# Patient Record
Sex: FEMALE | Race: WHITE | NOT HISPANIC OR LATINO | Employment: FULL TIME | URBAN - METROPOLITAN AREA
[De-identification: names, ages, dates, MRNs, and addresses within clinical notes are randomized per-mention and may not be internally consistent; named-entity substitution may affect disease eponyms.]

---
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
## 2018-07-09 ENCOUNTER — RECORDS - HEALTHEAST (OUTPATIENT)
Dept: ADMINISTRATIVE | Facility: OTHER | Age: 18
End: 2018-07-09

## 2019-08-26 ENCOUNTER — DOCUMENTATION ONLY (OUTPATIENT)
Dept: OTHER | Facility: CLINIC | Age: 19
End: 2019-08-26

## 2019-08-26 ENCOUNTER — AMBULATORY - HEALTHEAST (OUTPATIENT)
Dept: OTHER | Facility: CLINIC | Age: 19
End: 2019-08-26

## 2020-03-17 ENCOUNTER — VIRTUAL VISIT (OUTPATIENT)
Dept: FAMILY MEDICINE | Facility: OTHER | Age: 20
End: 2020-03-17

## 2020-03-18 NOTE — PROGRESS NOTES
"Date: 2020 15:57:40  Clinician: BRITTANY Yang  Clinician NPI: [id number]  Patient: Donna Hurtado  Patient : 2000  Patient Address: [address] [place] [id number]  Patient Phone: [phone]  Visit Protocol: URI  Patient Summary:  Donna is a 19 year old ( : 2000 ) female who initiated a Visit for COVID-19 (Coronavirus) evaluation and screening. When asked the question \"Please sign me up to receive news, health information and promotions. \", Donna responded \"Yes\".    Donna states her symptoms started gradually 3-6 days ago. After her symptoms started, they improved and then got worse again.   Her symptoms consist of wheezing, a sore throat, a cough, nasal congestion, tooth pain, ear pain, malaise, a headache, facial pain or pressure, and myalgia.   Symptom details     Nasal secretions: The color of her mucus is yellow.    Cough: Donna coughs every 5-10 minutes and her cough is more bothersome at night. Phlegm comes into her throat when she coughs. She does not believe her cough is caused by post-nasal drip. The color of the phlegm is yellow.     Sore throat: Donna reports having mild throat pain (1-3 on a 10 point pain scale), does not have exudate on her tonsils, and can swallow liquids. She is not sure if the lymph nodes in her neck are enlarged. A rash has not appeared on the skin since the sore throat started.     Wheezing: Donna has not ever been diagnosed with asthma. The wheezing does not interfere with her normal daily activities.    Facial pain or pressure: The facial pain or pressure feels worse when bending over or leaning forward.     Headache: She states the headache is moderate (4-6 on a 10 point pain scale).     Tooth pain: The tooth pain is caused by a cavity, recent dental work, or other mouth problems.      Donna denies having fever, chills, and rhinitis. She also denies taking antibiotic medication for the symptoms, having a sinus infection within the " past year, and having recent facial or sinus surgery in the past 60 days. She is not experiencing dyspnea.   Precipitating events  Within the past week, Donna has not been exposed to someone with strep throat. She has not recently been exposed to someone with influenza. Donna has been in close contact with the following high risk individuals: immunocompromised people, adults 65 or older, pregnant women, children under the age of 5, and people with asthma, heart disease or diabetes.   Pertinent COVID-19 (Coronavirus) information  Donna has not traveled internationally or to the areas where COVID-19 (Coronavirus) is widespread in the last 14 days before the start of her symptoms.   Donna has not had a close contact with a laboratory-confirmed COVID-19 patient within 14 days of symptom onset. She has had a close contact with a suspected COVID-19 patient within 14 days of symptom onset.   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   Pertinent medical history  Donna typically gets a yeast infection when she takes antibiotics. She has used fluconazole (Diflucan) to treat previous yeast infections. 2 doses of fluconazole (Diflucan) has typically been needed for symptoms to resolve in the past.  Donna needs a return to work/school note.   Weight: 120 lbs   Donna smokes or uses smokeless tobacco.   She denies pregnancy and denies breastfeeding. She does not menstruate.   Additional information as reported by the patient (free text): I have a bicuspid aortic valve. I work for [employer] and have shown symptoms and require screening before I can return to work. I have tightness in my chest especially when coughing and have developing joint pain.   Weight: 120 lbs    MEDICATIONS: propranolol oral, buspirone oral, quetiapine oral, clonazepam oral, ALLERGIES: NKDA  Clinician Response:  Dear Donna,   Based on the information you have provided, you do have symptoms that are consistent with  Coronavirus (COVID-19).  The coronavirus causes mild to severe respiratory illness with the most common symptoms including fever, cough and difficulty breathing. Unfortunately, many viruses cause similar symptoms and it can be difficult to distinguish between viruses, especially in mild cases, so we are presuming that anyone with cough or fever has coronavirus at this time.  Coronavirus/COVID-19 has reached the point of community spread in [place], meaning that we are finding the virus in people with no known exposure risk for claire the virus. Given the increasing commonness of coronavirus in the community we are no longer testing patients who are not critically ill.  For everyone else who has cough or fever, you should assume you are infected with coronavirus. Accordingly, you should self-quarantine for fourteen days from the first day your symptoms started. You should call if you find increasing shortness of breath, wheezing or sustained fever above 101.5. If you are significantly short of breath or experience chest pain you should call 911 or report to the nearest emergency department for urgent evaluation.    Isolate yourself at home.   Do Not allow any visitors  Do Not go to work or school  Do Not go to [employer],  centers, shopping, or other public places.  Do Not shake hands.  Avoid close contact with others (hugging, kissing).   Protect Others:    Cover Your Mouth and Nose with a mask, disposable tissue or wash cloth to avoid spreading germs to others.  Wash your hands and face frequently with soap and water.   If you develop significant shortness of breath that prevents you from doing normal activities, please call 911 or proceed to the nearest emergency room and alert them immediately that you have been in self-isolation for possible coronavirus.   For more information about COVID19 and options for caring for yourself at home, please visit the CDC website at  https://www.cdc.gov/coronavirus/2019-ncov/about/steps-when-sick.htmlFor more options for care at [facility], please visit our website at [email or web address]     COVID-19 (Coronavirus) General Information  With the increase in the number of COVID-19 (Coronavirus) cases, we understand you may have some questions. Below is some helpful information on COVID-19 (Coronavirus).  How can I protect myself and others from the COVID-19 (Coronavirus)?  Because there is currently no vaccine to prevent infection, the best way to protect yourself is to avoid being exposed to this virus. Put distance between yourself and other people if COVID-19 (Coronavirus) is spreading in your community. The virus is thought to spread mainly from person-to-person.     Between people who are in close contact with one another (within about 6 about) for prolonged period (10 minutes or longer).    Through respiratory droplets produced when an infected person coughs or sneezes.     The CDC recommends the following additional steps to protect yourself and others:     Wash your hands often with soap and water for at least 20 seconds, especially after blowing your nose, coughing, or sneezing; going to the bathroom; and before eating or preparing food.  Use an alcohol-based hand  that contains at least 60 percent alcohol if soap and water are not available.        Avoid touching your eyes, nose and mouth with unwashed hands.    Avoid close contact with people who are sick.    Stay home when you are sick.    Cover your cough or sneeze with a tissue, then throw the tissue in the trash.    Clean and disinfect frequently touched objects and surfaces.     You can help stop COVID-19 (Coronavirus) by knowing the signs and symptoms:     Fever    Cough    Shortness of breath     Contact your healthcare provider if   Develop symptoms   AND   Have been in close contact with a person known to have COVID-19 (Coronavirus)  or live in or have recently traveled from an area with ongoing spread of COVID-19 (Coronavirus). Call ahead before you go to a doctor's office or emergency room. Tell them about your recent travel and your symptoms.   For the most up to date information, visit the CDC's website.  Steps to help prevent the spread of COVID-19 (Coronavirus) if you are sick  If you are sick with COVID-19 (Coronavirus) or suspect you are infected with the virus that causes COVID-19 (Coronavirus), follow the steps below to help prevent the disease from spreading&nbsp;to people in your home and community.     Stay home except to get medical care. Home isolation may be started in consultation with your healthcare clinician.    Separate yourself from other people and animals in your home.    Call ahead before visiting your doctor if you have a medical appointment.    Wear a facemask when you are around other people.    Cover your cough and sneezes.    Clean your hands often.    Avoid sharing personal household items.    Clean and disinfect frequently touched objects and surfaces everyday.    You will need to have someone drop off medications or household supplies (if needed) at your house without coming inside or in contact with you or others living in your house.    Monitor your symptoms and seek prompt medical care if your illness is worsening (e.g. Difficulty breathing).    Discontinue home isolation only in consultation with your healthcare provider.     For more detailed and up to date information on what to do if you are sick, visit this link: What to Do If You Are Sick With Coronavirus Disease 2019 (COVID-19).  Do I need to be tested for COVID-19 (Coronavirus)?     At this time, the limited number of tests available are controlled by the state and local health departments and are being reserved for more seriously ill patients, those with known exposure to confirmed patients, and those with recent travel (within 14 days) to countries  "with high rates of COVID-19 (Coronavirus).    Decisions on which patients receive testing will be based on the local spread of COVID-19 (Coronavirus) as well as the symptoms. Your healthcare provider will make the final decision on whether you should be tested.    In the meantime, if you have concerns that you may have been exposed, it is reasonable to practice \"social distancing.\"&nbsp; If you are ill with a cold or flu-like illness, please monitor your symptoms and reach out to your healthcare provider if your symptoms worsen.    For more up to date information, visit this link: COVID-19 (Coronavirus) Frequently Asked Questions and Answers.      Diagnosis: Coronavirus infection, unspecified  Diagnosis ICD: B34.2  Additional Clinician Notes: In addition to your symptoms of Covid-19, I feel you may also be suffering from viral sinusitis. I have included over-the-counter and at-home remedies to help treat this. Should your sinus symptoms last beyond 7 days, I have included a promo code for a follow-up visit for further evaluation.  "

## 2021-05-30 VITALS — WEIGHT: 119.3 LBS | BODY MASS INDEX: 20.97 KG/M2

## 2021-06-08 NOTE — PROGRESS NOTES
ASSESSMENT & PLAN:  1. Anxiety    Continue with the Lexapro and compliance with medication intake.  Continue to reach out to her friend or her mom for support.  Monitor for symptom changes.  Recheck in 6 months, earlier if worse.  She was agreeable with the plans.    Medications Discontinued During This Encounter   Medication Reason     escitalopram oxalate (LEXAPRO) 5 MG tablet Reorder     escitalopram oxalate (LEXAPRO) 5 MG tablet Duplicate order     ethynodiol-ethinyl estradiol (ZOVIA 1/35E, 28,) 1-35 mg-mcg per tablet Therapy completed       CHIEF COMPLAINT:  Chief Complaint   Patient presents with     Medication Refill     Escitalopram- pt is requesting a 90 day supply.         HISTORY OF PRESENT ILLNESS:  Donna is a 16 y.o. female presenting to the clinic today for a follow up on her anxiety. She would prefer a prescription for a 90 day supply at a time, if possible. She has been taking 5 mg Lexapro daily, but not always within the same hour of the day which she is concerned about. She notes she is trying to be better about taking it at the same time every day but it is difficult because she is a college student. She did not notice much benefit to Lexapro initially, but she noticed she slowly stopped having panic attacks. She has had about 2 major panic attacks since March 2016 and she called her mom during those times which was helpful. She is happy having a panic attack only once every 6 months rather than 1-2 times per week as she was before. She knows that a major break up was the trigger for one of her panic attacks. She has made one close friend at college so far who she is able to share her emotions with. She is not seeing a therapist or counselor at this time. School is very stressful, but it is manageable for her. She thinks she would be feeling much worse without the Lexapro on board. GAD7 score of 4, PHQ9 of 3.    REVIEW OF SYSTEMS:   She denies feeling shaky, having excessive mood swings,  irritability, problems with focus or attention, life-altering changes, problems with sleep or problems with appetite. She got an IUD mostly because she not taking her OCP's at the same time every night and it was causing problems for her. All other systems are negative.     PFSH:     TOBACCO USE:  History   Smoking Status     Never Smoker   Smokeless Tobacco     Never Used        VITALS:  Vitals:    02/03/17 1553   BP: 110/60   Patient Site: Left Arm   Patient Position: Sitting   Cuff Size: Adult Regular   Pulse: 82   SpO2: 100%   Weight: 119 lb 4.8 oz (54.1 kg)     Wt Readings from Last 3 Encounters:   02/03/17 119 lb 4.8 oz (54.1 kg) (38 %, Z= -0.30)*   12/13/16 122 lb 5 oz (55.5 kg) (45 %, Z= -0.12)*   11/23/16 120 lb 2 oz (54.5 kg) (41 %, Z= -0.23)*     * Growth percentiles are based on [facility] 2-20 Years data.     Body mass index is 20.97 kg/(m^2).     PHYSICAL EXAM:  Visit Vitals     /60 (Patient Site: Left Arm, Patient Position: Sitting, Cuff Size: Adult Regular)     Pulse 82     Wt 119 lb 4.8 oz (54.1 kg)     LMP Comment: IUD     SpO2 100%     Breastfeeding No     BMI 20.97 kg/m2       Vital signs noted above. AAO ×3. Wearing glasses. Psych: Appears well and appropriate for age.  No flight of ideas.  Good eye contact.  Mood and insight appropriate.    DATA REVIEWED:  ADDITIONAL HISTORY SUMMARIZED (2): Reviewed Jasmin Castro's note 3/14/2016 regarding anxiety.   DECISION TO OBTAIN EXTRA INFORMATION (1): None.   RADIOLOGY TESTS (1): None.  LABS (1): None.  MEDICINE TESTS (1): None.  INDEPENDENT REVIEW (2 each): None.     The visit lasted a total of 7 minutes face to face with the patient. Over 50% of the time was spent counseling and educating the patient about her anxiety and panic attacks.     Brigitte SLOAN, am scribing for and in the presence of Dr. Julian.  Dr. Eliel SLOAN, personally performed the services described in this documentation, as scribed by Brigitte Gaspar in my presence, and it is both accurate and  complete.     MEDICATIONS:  Current Outpatient Prescriptions   Medication Sig Dispense Refill     escitalopram oxalate (LEXAPRO) 5 MG tablet TAKE ONE TABLET BY MOUTH ONCE DAILY 30 tablet 0     LEVONORGESTREL (MIRENA UTRN) by Intrauterine route.       No current facility-administered medications for this visit.         Total data points: 2

## 2023-09-19 ENCOUNTER — TELEPHONE (OUTPATIENT)
Dept: OPHTHALMOLOGY | Facility: CLINIC | Age: 23
End: 2023-09-19
Payer: COMMERCIAL

## 2023-09-19 ENCOUNTER — TELEPHONE (OUTPATIENT)
Dept: CALL CENTER | Age: 23
End: 2023-09-19
Payer: COMMERCIAL

## 2023-09-19 NOTE — TELEPHONE ENCOUNTER
Derik Mendez contacted [facility] on 09/19/23 and left a message. If patient calls back please schedule appointment for ER follow up..

## 2023-09-19 NOTE — TELEPHONE ENCOUNTER
Spoke to pt at   Left eye medial visual field disturbances times one month and over weekend worsened some and today seen in ED as medial part of vision loss.    MRI done at [facility] and no acute pathology.    No new floaters/flashing prior to onset per pt-- pt noticing the medial disturbance at times for on month before worsening over weekend/today.    Pt states has not continued to worsen today    Pt reporting dull ache    No other eye symptoms noted    Scheduled in acute clinic tomorrow and pt aware of date/time/location/duration/hospital based clinic/main clinic number/parking/non-hum[employer] insurance.    Reviewed with pt if felt vision acute worsening today/tonight ok to proceed to [address] [place] [address] ED.    Kendall Santiago RN 3:55 PM 09/19/23          [phone] home/other     Called twice at 1317 and left message with direct number/    Kendall Santiago RN 1:17 PM 09/19/23            [facility]    Phone Message    May a detailed message be left on voicemail: yes     Reason for Call: Appointment Intake    Referring Provider Name: ED- Dr. Kendall Wilson.   Diagnosis and/or Symptoms: Vision loss. Patient was seen in the ED this morning and was referred to Ophth because they didn't have the proper equipment to test for retina detachment per pt.     Priority= Emergency. Please call patient to advise ASAP.     Action Taken: Other: eye    Travel Screening: Not Applicable

## 2023-09-20 ENCOUNTER — TELEPHONE (OUTPATIENT)
Dept: OPHTHALMOLOGY | Facility: CLINIC | Age: 23
End: 2023-09-20
Payer: COMMERCIAL

## 2023-09-20 NOTE — TELEPHONE ENCOUNTER
Spoke to pt at 1345    Reviewed with add on to acute clinic not able to reschedule late this afternoon.    Offered tomorrow at 1230 in acute clinic and pt accepts    Kendall Santiago RN 1:49 PM 09/20/23            [facility]    Phone Message    May a detailed message be left on voicemail: yes     Reason for Call: Other: Pt is having car issues and would like to know if her 12:30 appt with  can be rescheduled to like 1:30 or 2:30. Please reach out to pt and advise. Thank You!     Action Taken: Message routed to:  [facility] eye    Travel Screening: Not Applicable

## 2023-09-21 ENCOUNTER — TELEPHONE (OUTPATIENT)
Dept: OPHTHALMOLOGY | Facility: CLINIC | Age: 23
End: 2023-09-21
Payer: COMMERCIAL

## 2023-09-21 ENCOUNTER — OFFICE VISIT (OUTPATIENT)
Dept: OPHTHALMOLOGY | Facility: CLINIC | Age: 23
End: 2023-09-21
Payer: COMMERCIAL

## 2023-09-21 DIAGNOSIS — H33.002 RHEGMATOGENOUS RETINAL DETACHMENT OF LEFT EYE: Primary | ICD-10-CM

## 2023-09-21 DIAGNOSIS — H53.9 CHANGES IN VISION: ICD-10-CM

## 2023-09-21 PROCEDURE — 99213 OFFICE O/P EST LOW 20 MIN: CPT | Performed by: OPHTHALMOLOGY

## 2023-09-21 PROCEDURE — 92250 FUNDUS PHOTOGRAPHY W/I&R: CPT | Performed by: OPHTHALMOLOGY

## 2023-09-21 PROCEDURE — 92083 EXTENDED VISUAL FIELD XM: CPT | Mod: 26 | Performed by: OPHTHALMOLOGY

## 2023-09-21 PROCEDURE — 92083 EXTENDED VISUAL FIELD XM: CPT | Performed by: OPHTHALMOLOGY

## 2023-09-21 PROCEDURE — 99204 OFFICE O/P NEW MOD 45 MIN: CPT | Mod: GC | Performed by: OPHTHALMOLOGY

## 2023-09-21 PROCEDURE — 92133 CPTRZD OPH DX IMG PST SGM ON: CPT | Performed by: OPHTHALMOLOGY

## 2023-09-21 PROCEDURE — 99207 OCT OPTIC NERVE RNFL SPECTRALIS OU (BOTH EYES): CPT | Mod: 26 | Performed by: OPHTHALMOLOGY

## 2023-09-21 PROCEDURE — 99207 PR BUNDLED PROCEDURE IN GLOBAL PKG: CPT | Mod: 26 | Performed by: OPHTHALMOLOGY

## 2023-09-21 PROCEDURE — 92134 CPTRZ OPH DX IMG PST SGM RTA: CPT | Mod: 26 | Performed by: OPHTHALMOLOGY

## 2023-09-21 PROCEDURE — 92134 CPTRZ OPH DX IMG PST SGM RTA: CPT | Performed by: OPHTHALMOLOGY

## 2023-09-21 RX ORDER — CETIRIZINE HYDROCHLORIDE 10 MG/1
1 TABLET ORAL DAILY
COMMUNITY

## 2023-09-21 RX ORDER — QUETIAPINE FUMARATE 25 MG/1
1 TABLET, FILM COATED ORAL AT BEDTIME
COMMUNITY

## 2023-09-21 RX ORDER — FLUCONAZOLE 150 MG/1
150 TABLET ORAL
COMMUNITY

## 2023-09-21 RX ORDER — BUSPIRONE HYDROCHLORIDE 10 MG/1
10 TABLET ORAL
COMMUNITY

## 2023-09-21 RX ORDER — CLONAZEPAM 1 MG/1
1 TABLET ORAL
COMMUNITY

## 2023-09-21 ASSESSMENT — CUP TO DISC RATIO
OD_RATIO: 0.3
OS_RATIO: 0.3

## 2023-09-21 ASSESSMENT — CONF VISUAL FIELD
METHOD: COUNTING FINGERS
OS_INFERIOR_TEMPORAL_RESTRICTION: 0
OS_SUPERIOR_TEMPORAL_RESTRICTION: 0
OS_INFERIOR_NASAL_RESTRICTION: 0
OD_SUPERIOR_TEMPORAL_RESTRICTION: 0
OS_SUPERIOR_NASAL_RESTRICTION: 0
OD_SUPERIOR_NASAL_RESTRICTION: 0
OD_INFERIOR_NASAL_RESTRICTION: 0
OD_NORMAL: 1
OD_INFERIOR_TEMPORAL_RESTRICTION: 0
OS_NORMAL: 1

## 2023-09-21 ASSESSMENT — VISUAL ACUITY
OD_PH_CC: 20/20
OD_CC+: -2
OD_CC: 20/30
CORRECTION_TYPE: GLASSES
OD_PH_CC+: -1
METHOD: SNELLEN - LINEAR
OS_CC: 20/40

## 2023-09-21 ASSESSMENT — REFRACTION_MANIFEST
OD_SPHERE: -3.50
OD_AXIS: 015
OS_CYLINDER: SPHERE
OS_SPHERE: -3.75
OD_CYLINDER: +1.25

## 2023-09-21 ASSESSMENT — REFRACTION_WEARINGRX
OD_SPHERE: -2.00
OD_CYLINDER: +0.75
OS_AXIS: 115
OS_SPHERE: -3.00
OD_AXIS: 011
OS_CYLINDER: +0.25

## 2023-09-21 ASSESSMENT — TONOMETRY
IOP_METHOD: ICARE
OD_IOP_MMHG: 21
OS_IOP_MMHG: 20

## 2023-09-21 ASSESSMENT — SLIT LAMP EXAM - LIDS
COMMENTS: NORMAL
COMMENTS: NORMAL

## 2023-09-21 ASSESSMENT — EXTERNAL EXAM - RIGHT EYE: OD_EXAM: NORMAL

## 2023-09-21 ASSESSMENT — EXTERNAL EXAM - LEFT EYE: OS_EXAM: NORMAL

## 2023-09-21 NOTE — TELEPHONE ENCOUNTER
Contacted patient regarding surgery with Dr. Shepherd.  Explained to patient that we are still waiting to confirm with OR that this is available. If unable to schedule at the [facility], there is a possibility that surgery could be completed at the [facility]. Asked patient to follow Dr. Shepherd's instructions and remain NPO after midnight.     Patient aware that writer will contact patient around 7:00 a.m. to determine if case can go/plan for surgery start. Patient was understanding and will potentially plan to report to the 5th floor of the [facility] tomorrow, 9/22/2023 at 9:00 a.m. She has no further questions at this time.    Jenn Velazquez on 9/21/2023 at 5:50 PM

## 2023-09-21 NOTE — NURSING NOTE
Chief Complaints and History of Present Illnesses   Patient presents with    Vision Changes Os     Chief Complaint(s) and History of Present Illness(es)       Vision Changes Os              Laterality: left eye    Onset: 1 month ago    Course: gradually worsening    Associated symptoms: haloes, eye pain, headache and floaters.  Negative for flashes    Treatments tried: artificial tears              Comments    Here for vision changes left eye that started about 1 month ago. Worsen a several days ago. Medial visual field loss and tunnel vision. Some eye pain and HA. Notes halos and floaters without flashes. Uses AT as needed. Some pressure sensation on tear duct. No issues with the right eye.    Hx of amblyopia left eye s/p strabismus sx left eye at age 2.    Pawan Garces COT 12:22 PM September 21, 2023

## 2023-09-21 NOTE — LETTER
September 21, 2023      Re: Donna Hurtado   2000    To Whom It May Concern:    This is to confirm that the above patient was seen on 9/21/2023.  Please excuse her  from work today and tomorrow.    Thank you for your cooperation in this matter.  Please do not hesitate to contact me if you have any further questions.    Sincerely,      Baylee Collins MD

## 2023-09-21 NOTE — PATIENT INSTRUCTIONS
Please do not eat or drink anything by mouth after MIDNIGHT     We will call you tomorrow to schedule your surgery

## 2023-09-21 NOTE — PROGRESS NOTES
"   CC: Retinal detachment  consult  First appointment with me  Referred by:   HPI: Donna Hurtado is a 23 year old year-old patient who presents for 1 month of gradual medial visual field loss and tunnel vision of the left eye.   Endorses pain and associated HA. Endorses halos and floaters.   States that she has had a very stressful summer due to \"family issues,\" a wedding, and moving into a new house.      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  FH:  glaucoma or Age related macular degeneration.+ ulcerative colitis on mom's side   Review of systems for the eyes was negative other than the pertinent positives/negatives listed in the HPI.      Retinal Imaging:  OCT 09/21/23   RE: normal foveal contour  LE: macula subretinal fluid     optos consistent with exam  Autofluorescence: peripheral areas of hypoautofluorescence left eye   fluorescein angiography:  Normal av and choroidal filling; mild peripheral vascular leakage left eye; no vasculitis  OVF: right eye: few sup spots of decreased sensitivity  Left eye: nasal areas of decreased sensitivity    Assessment & Plan:  # Retinal detachment  Macula off left eye   Possibly chronic  Inf peripheral small holes    recommend  scleral buckle left eye/  possible cryotherapy/ possible indirect laser ophthalmoscopy/ possible subretinal fluid drainage left eye   2 hours  General anesthesia    I reviewed the indications, risks, benefits, and alternatives of the proposed surgical procedure including, but not limited to, failure to improve vision or further loss of vision,  and need for additional surgery, bleeding, infection, loss of vision and the remote possibility of complications of anesthesia. 1:1000 risk of infection/bleed/loss of eye; 1:100 risk of RD and need for further surgery. Patient aware " of prolonged healing after retinal surgery (up to a year after surgery) as well as possibility of a gas/SO  instillation into eye. Patient agreed to proceed with surgery.  I provided multiple opportunities for the questions, answered all questions to the best of my ability, and confirmed that my answers and my discussion were understood.   With scleral buckle risk for diplopia.    #myopia both eyes  # history of amblyopia left eye   # retina hole left eye  With surrounded pigment  Recommend prophulactic laser.  Patient would like to wait until after surgery left eye        Baylee Collins MD  Resident Physician, PGY-2  Department of Ophthalmology  09/21/23 1:14 PM  ~~~~~~~~~~~~~~~~~~~~~~~~~~~~~~~~~~   Complete documentation of historical and exam elements from today's encounter can be found in the full encounter summary report (not reduplicated in this progress note).  I personally obtained the chief complaint(s) and history of present illness.  I confirmed and edited as necessary the review of systems, past medical/surgical history, family history, social history, and examination findings as documented by others; and I examined the patient myself.  I personally reviewed the relevant tests, images, and reports as documented above.  I personally reviewed the ophthalmic test(s) associated with this encounter, agree with the interpretation(s) as documented by the resident/fellow, and have edited the corresponding report(s) as necessary.   I formulated and edited as necessary the assessment and plan and discussed the findings and management plan with the patient and family    Slime Shepherd MD  Professor of Ophthalmology.   Vitreo-retinal surgeon   Department of Ophthalmology and Visual Neurosciences   [facility]  Phone: [phone]   Fax: [phone]

## 2023-09-21 NOTE — LETTER
September 21, 2023      Re: Donna Hurtado   2000    To Whom It May Concern:    This is to confirm that the above patient was seen on 9/21/2023. Please excuse Donna Hurtado today and she is unable to return to work tomorrow.    Thank you for your cooperation in this matter.  Please do not hesitate to contact me if you have any further questions.    Sincerely,      Baylee Collins MD

## 2023-09-21 NOTE — PROGRESS NOTES
Pre-Operative H & P     CC:  Preoperative exam to assess for increased cardiopulmonary risk while undergoing surgery and anesthesia.    Date of Encounter: 9/21/2023  Primary Care Physician:  No Ref-Primary, Physician     Reason for visit:   Encounter Diagnoses   Name Primary?    Changes in vision Yes    Rhegmatogenous retinal detachment of left eye        [facility]  Donna Hurtado is a 23 year old female who presents for pre-operative H & P in preparation for retinal detachment repair with Dr. Shepherd on 09/22/23 at [facility].     History is obtained from the patient and chart review.     Hx of abnormal bleeding or anti-platelet use: regular ibuprofen use for headache    Menstrual history: No LMP recorded.: 7 years ago (IUD)    Prior to Admission Medications  Current Outpatient Medications   Medication Sig Dispense Refill    busPIRone (BUSPAR) 10 MG tablet Take 10 mg by mouth      cetirizine (ZYRTEC) 10 MG tablet Take 1 tablet by mouth daily      clonazePAM (KLONOPIN) 1 MG tablet Take 1 mg by mouth      fluconazole (DIFLUCAN) 150 MG tablet Take 150 mg by mouth      QUEtiapine (SEROQUEL) 25 MG tablet Take 1 tablet by mouth At Bedtime         Family History  Family History   Problem Relation Age of Onset    Glaucoma No family hx of     Macular Degeneration No family hx of        The complete review of systems is negative other than noted in the HPI or here.     Preprocedure Note            No Anesthesia note exists            Physical Exam  Vitals: /62 HR 56 TEMP 97.4 O2SAT 98%  Constitutional: Awake, alert, cooperative, no apparent distress, and appears stated age.  Eyes: Pupils pharmacologically dilated, extra ocular muscles intact, sclera clear, conjunctiva normal.  HENT: Normocephalic, oral pharynx with moist mucus membranes, good dentition. No goiter appreciated.   Respiratory: Clear to auscultation bilaterally, no crackles or wheezing.  Cardiovascular: Regular rate and rhythm,  normal S1 and S2, and no murmur noted.  No edema. Palpable pulses to radial  DP and PT arteries.   GI: Normal bowel sounds, soft, non-distended, non-tender, no masses palpated, no hepatosplenomegaly.    Lymph/Hematologic: No cervical lymphadenopathy and no supraclavicular lymphadenopathy.  Genitourinary:  Deferred   Skin: Warm and dry.  No rashes at anticipated surgical site.   Musculoskeletal: Full ROM of neck. There is no redness, warmth, or swelling of the joints. Gross motor strength is normal.    Neurologic: Awake, alert, oriented to name, place and time. Cranial nerves II-XII are grossly intact. Gait is normal.   Neuropsychiatric: Calm, cooperative. Normal affect.     PRIOR LABS/DIAGNOSTIC STUDIES:   All labs and imaging personally reviewed     The patient's records and results personally reviewed by this provider.     Outside records reviewed from: care everywhere    LAB/DIAGNOSTIC STUDIES TODAY: None     ASSESSMENT and PLAN    Plan/Recommendations  Pt will be optimized for the proposed procedure.  See below for details on the assessment, risk, and preoperative recommendations     Eye:  - Past ocular history: Ambylopia     NEUROLOGY  - No h/o CVA, TIA  - Post Op delirium risk factors:  None     ENT  - No current airway concerns.  Will need to be reassessed day of surgery.  Mallampati: Anesthesia will assess  TM: Anesthesia will assess     CARDIAC  - No h/o CAD/NSTEMI, CHF  - Does not have HLD and has not been on low-dose statin by choice  Patient performs 10 or more METS   Revised Cardiac Risk Index: 0.4% risk of major adverse cardiac event.  RCRI-Minimal risk: Class 0  3.9% complication rate      PULMONARY    HOLDEN risk: Minimal Risk     Total Score: 0        Social History     Socioeconomic History    Marital status:      Spouse name: None    Number of children: None    Years of education: None    Highest education level: None   Tobacco Use    Smoking status: Every Day     Types: Other    Smokeless  tobacco: Never           GI  Denies GERD  History of esophageal stricture but s/p endoscopic repair     PONV risk score=0.  (If > 2, anti-emetic intervention is recommended.)        /RENAL  - No CKD     ENDOCRINE    There is no height or weight on file to calculate BMI.  - No history of Diabetes Mellitus or thyroid abnormalities     HEME  - No history of abnormal bleeding or antiplatelet use or anticoagulant use.  VTE risk: 0.26%     MSK  - Denies any muscle cramping or other MSK concerns     My privilege to be part of your care,  Baylee Collins MD  Ophthalmology PGY-2 resident physician

## 2023-09-22 ENCOUNTER — ANESTHESIA (OUTPATIENT)
Dept: SURGERY | Facility: AMBULATORY SURGERY CENTER | Age: 23
End: 2023-09-22
Payer: COMMERCIAL

## 2023-09-22 ENCOUNTER — ANESTHESIA EVENT (OUTPATIENT)
Dept: SURGERY | Facility: AMBULATORY SURGERY CENTER | Age: 23
End: 2023-09-22
Payer: COMMERCIAL

## 2023-09-22 ENCOUNTER — HOSPITAL ENCOUNTER (OUTPATIENT)
Facility: AMBULATORY SURGERY CENTER | Age: 23
Discharge: HOME | End: 2023-09-22
Attending: OPHTHALMOLOGY
Payer: COMMERCIAL

## 2023-09-22 VITALS
RESPIRATION RATE: 11 BRPM | WEIGHT: 108 LBS | HEART RATE: 89 BPM | HEIGHT: 64 IN | DIASTOLIC BLOOD PRESSURE: 41 MMHG | SYSTOLIC BLOOD PRESSURE: 106 MMHG | BODY MASS INDEX: 18.44 KG/M2 | TEMPERATURE: 98.1 F | OXYGEN SATURATION: 96 %

## 2023-09-22 DIAGNOSIS — H33.002 RHEGMATOGENOUS RETINAL DETACHMENT OF LEFT EYE: ICD-10-CM

## 2023-09-22 DIAGNOSIS — Q23.81 BICUSPID AORTIC VALVE: Primary | ICD-10-CM

## 2023-09-22 LAB
HCG UR QL: NEGATIVE
INTERNAL QC OK POCT: NORMAL
POCT KIT EXPIRATION DATE: NORMAL
POCT KIT LOT NUMBER: NORMAL

## 2023-09-22 PROCEDURE — 67107 REPAIR DETACHED RETINA: CPT | Mod: RT | Performed by: OPHTHALMOLOGY

## 2023-09-22 PROCEDURE — 81025 URINE PREGNANCY TEST: CPT | Performed by: PATHOLOGY

## 2023-09-22 PROCEDURE — 67107 REPAIR DETACHED RETINA: CPT | Mod: RT

## 2023-09-22 DEVICE — EYE IMP STRIP GROOVED STYLE 220 S2998: Type: IMPLANTABLE DEVICE | Site: EYE | Status: FUNCTIONAL

## 2023-09-22 DEVICE — EYE IMP SLEEVE STYLE 70 S3018: Type: IMPLANTABLE DEVICE | Site: EYE | Status: FUNCTIONAL

## 2023-09-22 DEVICE — EYE IMP BAND 2.5MM STYLE 240 S2987: Type: IMPLANTABLE DEVICE | Site: EYE | Status: FUNCTIONAL

## 2023-09-22 RX ORDER — PROPARACAINE HYDROCHLORIDE 5 MG/ML
1 SOLUTION/ DROPS OPHTHALMIC ONCE
Status: COMPLETED | OUTPATIENT
Start: 2023-09-22 | End: 2023-09-22

## 2023-09-22 RX ORDER — CYCLOPENTOLAT/TROPIC/PHENYLEPH 1%-1%-2.5%
1 DROPS (EA) OPHTHALMIC (EYE)
Status: COMPLETED | OUTPATIENT
Start: 2023-09-22 | End: 2023-09-22

## 2023-09-22 RX ORDER — ATROPINE SULFATE 10 MG/ML
SOLUTION/ DROPS OPHTHALMIC PRN
Status: DISCONTINUED | OUTPATIENT
Start: 2023-09-22 | End: 2023-09-22 | Stop reason: HOSPADM

## 2023-09-22 RX ORDER — PROPOFOL 10 MG/ML
INJECTION, EMULSION INTRAVENOUS CONTINUOUS PRN
Status: DISCONTINUED | OUTPATIENT
Start: 2023-09-22 | End: 2023-09-22

## 2023-09-22 RX ORDER — GLYCOPYRROLATE 0.2 MG/ML
INJECTION, SOLUTION INTRAMUSCULAR; INTRAVENOUS PRN
Status: DISCONTINUED | OUTPATIENT
Start: 2023-09-22 | End: 2023-09-22

## 2023-09-22 RX ORDER — FENTANYL CITRATE 50 UG/ML
INJECTION, SOLUTION INTRAMUSCULAR; INTRAVENOUS PRN
Status: DISCONTINUED | OUTPATIENT
Start: 2023-09-22 | End: 2023-09-22

## 2023-09-22 RX ORDER — NEOMYCIN POLYMYXIN B SULFATES AND DEXAMETHASONE 3.5; 10000; 1 MG/ML; [USP'U]/ML; MG/ML
1 SUSPENSION/ DROPS OPHTHALMIC 4 TIMES DAILY
Qty: 5 ML | Refills: 1 | Status: SHIPPED | OUTPATIENT
Start: 2023-09-22

## 2023-09-22 RX ORDER — SODIUM CHLORIDE, SODIUM LACTATE, POTASSIUM CHLORIDE, CALCIUM CHLORIDE 600; 310; 30; 20 MG/100ML; MG/100ML; MG/100ML; MG/100ML
INJECTION, SOLUTION INTRAVENOUS CONTINUOUS PRN
Status: DISCONTINUED | OUTPATIENT
Start: 2023-09-22 | End: 2023-09-22

## 2023-09-22 RX ORDER — BALANCED SALT SOLUTION 6.4; .75; .48; .3; 3.9; 1.7 MG/ML; MG/ML; MG/ML; MG/ML; MG/ML; MG/ML
SOLUTION OPHTHALMIC PRN
Status: DISCONTINUED | OUTPATIENT
Start: 2023-09-22 | End: 2023-09-22 | Stop reason: HOSPADM

## 2023-09-22 RX ORDER — PROPOFOL 10 MG/ML
INJECTION, EMULSION INTRAVENOUS PRN
Status: DISCONTINUED | OUTPATIENT
Start: 2023-09-22 | End: 2023-09-22

## 2023-09-22 RX ORDER — POLYMYXIN B SULFATE AND TRIMETHOPRIM 1; 10000 MG/ML; [USP'U]/ML
SOLUTION OPHTHALMIC PRN
Status: DISCONTINUED | OUTPATIENT
Start: 2023-09-22 | End: 2023-09-22 | Stop reason: HOSPADM

## 2023-09-22 RX ORDER — DEXAMETHASONE SODIUM PHOSPHATE 4 MG/ML
INJECTION, SOLUTION INTRA-ARTICULAR; INTRALESIONAL; INTRAMUSCULAR; INTRAVENOUS; SOFT TISSUE PRN
Status: DISCONTINUED | OUTPATIENT
Start: 2023-09-22 | End: 2023-09-22 | Stop reason: HOSPADM

## 2023-09-22 RX ORDER — DEXTROAMPHETAMINE SACCHARATE, AMPHETAMINE ASPARTATE, DEXTROAMPHETAMINE SULFATE AND AMPHETAMINE SULFATE 5; 5; 5; 5 MG/1; MG/1; MG/1; MG/1
20 TABLET ORAL 2 TIMES DAILY
COMMUNITY

## 2023-09-22 RX ORDER — LIDOCAINE HYDROCHLORIDE 20 MG/ML
INJECTION, SOLUTION INFILTRATION; PERINEURAL PRN
Status: DISCONTINUED | OUTPATIENT
Start: 2023-09-22 | End: 2023-09-22

## 2023-09-22 RX ORDER — ONDANSETRON 2 MG/ML
INJECTION INTRAMUSCULAR; INTRAVENOUS PRN
Status: DISCONTINUED | OUTPATIENT
Start: 2023-09-22 | End: 2023-09-22

## 2023-09-22 RX ORDER — DEXAMETHASONE SODIUM PHOSPHATE 4 MG/ML
INJECTION, SOLUTION INTRA-ARTICULAR; INTRALESIONAL; INTRAMUSCULAR; INTRAVENOUS; SOFT TISSUE PRN
Status: DISCONTINUED | OUTPATIENT
Start: 2023-09-22 | End: 2023-09-22

## 2023-09-22 RX ADMIN — LIDOCAINE HYDROCHLORIDE 100 MG: 20 INJECTION, SOLUTION INFILTRATION; PERINEURAL at 13:34

## 2023-09-22 RX ADMIN — Medication 1 DROP: at 12:22

## 2023-09-22 RX ADMIN — FENTANYL CITRATE 50 MCG: 50 INJECTION, SOLUTION INTRAMUSCULAR; INTRAVENOUS at 13:33

## 2023-09-22 RX ADMIN — Medication 1 DROP: at 12:18

## 2023-09-22 RX ADMIN — Medication 1 DROP: at 12:26

## 2023-09-22 RX ADMIN — FENTANYL CITRATE 50 MCG: 50 INJECTION, SOLUTION INTRAMUSCULAR; INTRAVENOUS at 13:56

## 2023-09-22 RX ADMIN — PROPOFOL 200 MG: 10 INJECTION, EMULSION INTRAVENOUS at 13:34

## 2023-09-22 RX ADMIN — PROPARACAINE HYDROCHLORIDE 1 DROP: 5 SOLUTION/ DROPS OPHTHALMIC at 12:18

## 2023-09-22 RX ADMIN — FENTANYL CITRATE 50 MCG: 50 INJECTION, SOLUTION INTRAMUSCULAR; INTRAVENOUS at 14:30

## 2023-09-22 RX ADMIN — ONDANSETRON: 2 INJECTION INTRAMUSCULAR; INTRAVENOUS at 16:29

## 2023-09-22 RX ADMIN — PROPOFOL 150 MCG/KG/MIN: 10 INJECTION, EMULSION INTRAVENOUS at 13:34

## 2023-09-22 RX ADMIN — GLYCOPYRROLATE 0.2 MG: 0.2 INJECTION, SOLUTION INTRAMUSCULAR; INTRAVENOUS at 13:30

## 2023-09-22 RX ADMIN — SODIUM CHLORIDE, SODIUM LACTATE, POTASSIUM CHLORIDE, CALCIUM CHLORIDE: 600; 310; 30; 20 INJECTION, SOLUTION INTRAVENOUS at 13:18

## 2023-09-22 RX ADMIN — ONDANSETRON 4 MG: 2 INJECTION INTRAMUSCULAR; INTRAVENOUS at 13:42

## 2023-09-22 RX ADMIN — DEXAMETHASONE SODIUM PHOSPHATE 4 MG: 4 INJECTION, SOLUTION INTRA-ARTICULAR; INTRALESIONAL; INTRAMUSCULAR; INTRAVENOUS; SOFT TISSUE at 13:34

## 2023-09-22 RX ADMIN — FENTANYL CITRATE 50 MCG: 50 INJECTION, SOLUTION INTRAMUSCULAR; INTRAVENOUS at 15:04

## 2023-09-22 NOTE — DISCHARGE INSTRUCTIONS
[facility]  Home Care Following Anesthesia  For 24 hours after surgery:  Get plenty of rest.  A responsible adult must stay with you for at least 24 hours after you leave the surgery center.  Do not drive or use heavy equipment.  If you have weakness or tingling, don't drive or use heavy equipment until this feeling goes away.   Do not drink alcohol.   Avoid strenuous or risky activities.  Ask for help when climbing stairs.  You may feel lightheaded.  IF so, sit for a few minutes before standing.  Have someone help you get up.   If you have nausea (feel sick to your stomach): Drink only clear liquids such as apple juice, ginger ale, broth or 7-Up.  Rest may also help.  Be sure to drink enough fluids.  Move to a regular diet as you feel able.   You may have a slight fever.  Call the doctor if your fever is over 100 F (37.7 C) (taken under the tongue) or lasts longer than 24 hours.  You may have a dry mouth, a sore throat, muscle aches or trouble sleeping. These should go away after 24 hours.  Do not make important or legal decisions.   It is recommended to avoid smoking.   If you use hormonal birth control (such as the pill, patch, ring or implants):  You will need a second form of birth control for 7 days (condoms, a diaphragm or contraceptive foam).  While in the surgery center, you received a medicine called Sugammadex.  Hormonal birth control (such as the pill, patch, ring or implants) will not work as well for a week after taking this medicine.         Tips for taking pain medications  To get the best pain relief possible, remember these points:  Take pain medications as directed, before pain becomes severe.  Pain medication can upset your stomach: taking it with food may help.  Constipation is a common side effect of pain medication. Drink plenty of  fluids.  Eat foods high in fiber. Take a stool softener if recommended by your doctor or pharmacist.  Do not drink alcohol,  drive or operate machinery while taking pain medications.  Ask about other ways to control pain, such as with heat, ice or relaxation.    Tylenol/Acetaminophen Consumption    If you feel your pain relief is insufficient, you may take Tylenol/Acetaminophen in addition to your narcotic pain medication.   Be careful not to exceed 4,000 mg of Tylenol/Acetaminophen in a 24 hour period from all sources.  If you are taking extra strength Tylenol/acetaminophen (500 mg), the maximum dose is 8 tablets in 24 hours.  If you are taking regular strength acetaminophen (325 mg), the maximum dose is 12 tablets in 24 hours.    Call a doctor for any of the following:  Signs of infection (fever, growing tenderness at the surgery site, a large amount of drainage or bleeding, severe pain, foul-smelling drainage, redness, swelling).  It has been over 8 to 10 hours since surgery and you are still not able to urinate (pass water).  Headache for over 24 hours.  Numbness, tingling or weakness the day after surgery (if you had spinal anesthesia).  Signs of Covid-19 infection (temperature over 100 degrees, shortness of breath, cough, loss of taste/smell, generalized body aches, persistent headache, chills, sore throat, nausea/vomiting/diarrhea)  Your doctor is:       Dr. Slime Shepherd, Ophthalmology: [phone]               Or dial [phone] and ask for the resident on call for:  Ophthalmology  For emergency care, call the:  [place]:  [phone] (TTY for hearing impaired: [phone])

## 2023-09-22 NOTE — ANESTHESIA CARE TRANSFER NOTE
Patient: Donna Hurtado    Procedure: Procedure(s):  SCLERAL BUCKLING /Cryotherapy /subretinal fluid drainage       Diagnosis: Rhegmatogenous retinal detachment of left eye [H33.002]  Diagnosis Additional Information: No value filed.    Anesthesia Type:   General     Note:    Oropharynx: oropharynx clear of all foreign objects  Level of Consciousness: awake  Oxygen Supplementation: room air    Independent Airway: airway patency satisfactory and stable  Dentition: dentition unchanged  Vital Signs Stable: post-procedure vital signs reviewed and stable  Report to RN Given: handoff report given  Patient transferred to: PACU  Comments: VSS and WNL, comfortable, no PONV, report to Melanie RN  Handoff Report: Identifed the Patient, Identified the Reponsible Provider, Reviewed the pertinent medical history, Discussed the surgical course, Reviewed Intra-OP anesthesia mangement and issues during anesthesia, Set expectations for post-procedure period and Allowed opportunity for questions and acknowledgement of understanding      Vitals:  Vitals Value Taken Time   BP 94/48 09/22/23 1545   Temp 36.7  C (98.1  F) 09/22/23 1541   Pulse 75 09/22/23 1549   Resp 24 09/22/23 1549   SpO2 94 % 09/22/23 1549   Vitals shown include unvalidated device data.    Electronically Signed By: BRITTANY Orozco CRNA  September 22, 2023  3:50 PM

## 2023-09-22 NOTE — TELEPHONE ENCOUNTER
Contacted patient to confirm that surgery is on for today at the [facility]. Patient knows arrival time at 9:00 a.m. for 10:30 a.m. surgery. Discussed timing of the day   2 hrs for surgery, 1-2 hrs in the recovery room. Patient confirmed that she does have a  (her ) will be with her the entire day. Knows to report to the 5th floor of the surgery center. She has no further questions at this time.     OR and surgeon aware of surgery today 9/22/2023    Jenn Velazquez on 9/22/2023 at 7:09 AM

## 2023-09-22 NOTE — ANESTHESIA PROCEDURE NOTES
Airway       Patient location during procedure: OR       Procedure Start/Stop Times: 9/22/2023 1:35 PM  Staff -        CRNA: Mariposa Urrutia       Performed By: CRNA  Consent for Airway        Urgency: elective  Indications and Patient Condition       Indications for airway management: patt-procedural       Induction type:intravenous       Mask difficulty assessment: 1 - vent by mask    Final Airway Details       Final airway type: supraglottic airway    Supraglottic Airway Details        Type: LMA       Brand: I-Gel       LMA size: 4    Post intubation assessment        Placement verified by: capnometry, equal breath sounds and chest rise        Number of attempts at approach: 1       Number of other approaches attempted: 0       Ease of procedure: easy       Dentition: Intact and Unchanged    Medication(s) Administered   Medication Administration Time: 9/22/2023 1:35 PM

## 2023-09-22 NOTE — TELEPHONE ENCOUNTER
Late entry: Called patient at 7:51 a.m. to inform her of update from OR team that surgery delay. New arrival time of 11:30 a.m. surgery start 1:00 p.m. Patient has no further questions.    Dr. Shepherd aware of new surgical time.

## 2023-09-22 NOTE — ANESTHESIA POSTPROCEDURE EVALUATION
Patient: Donna Hurtado    Procedure: Procedure(s):  SCLERAL BUCKLING /Cryotherapy /subretinal fluid drainage       Anesthesia Type:  General    Note:  Disposition: Outpatient   Postop Pain Control: Uneventful            Sign Out: Well controlled pain   PONV: No   Neuro/Psych: Uneventful            Sign Out: Acceptable/Baseline neuro status   Airway/Respiratory: Uneventful            Sign Out: Acceptable/Baseline resp. status   CV/Hemodynamics: Uneventful            Sign Out: Acceptable CV status; No obvious hypovolemia; No obvious fluid overload   Other NRE: NONE   DID A NON-ROUTINE EVENT OCCUR? No           Last vitals:  Vitals Value Taken Time   /41 09/22/23 1600   Temp 36.7  C (98.1  F) 09/22/23 1541   Pulse 87 09/22/23 1603   Resp 12 09/22/23 1603   SpO2 97 % 09/22/23 1603   Vitals shown include unvalidated device data.    Electronically Signed By: Mickey Barkley MD  September 22, 2023  4:54 PM

## 2023-09-22 NOTE — OP NOTE
SURGEON: ASHLEY TALLEY MD  Assistant: Irwin Woods MD  PREOPERATIVE DIAGNOSIS:   1. Retinal detachment macula off, left eye   POSTOPERATIVE DIAGNOSIS: same   NAME OF THE PROCEDURE:   1. Scleral buckle 240/220/70, right eye  2. Subretinal fluid drainage, right eye   3. Cryotherapy, right eye   ANESTHESIA: General anesthesia  COMPLICATIONS: none   INDICATIONS: 23 year old patient with a diagnosis of Retinal detachment macula off of the left eye   DESCRIPTION OF THE PROCEDURE   The patient was brought into the OR where general anesthesia was given by the anesthesia department. The eye was then prepped and draped in the usual fashion for ophthalmic surgery, including the installation of one drop of povidone iodine   A retrobulbar block consistent of a 1:1 mixtures of 2% Lidocaine and 0.75 % of marcaine with epinephrine and wydase was administered.   A 360 degree conjunctival peritomy was created and the quadrants cleared with the alonso scissors. The muscles were isolated and looped with 2-0 silk sutures. A 240 band & 220 element connected together with 2x 70 sleeves (one mid-complex and another at the leading edge) previously soak in polymyxin solution was placed under each of the rectus muscles. Once in position a 70 sleeve was placed to connect the 240 bands together; the sleeve was located in the superonasal quadrant. The band was tightened to an appropriate indentation. Next 5-0 mersilene horizontal mattress sutures were placed in each quadrant to secure the band. All sutures were only loosely tied to allow for adequate cryotherapy.  The fundus exam revealed a subtotal Retinal detachment from approximately 12 o'clock to 6 o'clock. The macula was off; the optic nerve was pink; there were 3 peripheral holes.  An inferior temporal drainage sclerotomy was performed in the inferior temporal quadrant posterior to the insertion of the lateral rectus in the areas of highest subretinal fluid. The subretinal fluid  was drained spontaneously.  Next, Cryotherapy was completed along the extent of the peripheral retinal holes that were identified.      Next, The buckle sutures were tied down in all quadrants   The buckle height was verified to be appropriate. The buckle was rinsed with neosporine solution and the muscle silk sutures removed. The conjunctiva was closed with 6-0 plain suture. Additional retrobulbar block mix was placed into the intraconal space. The lid speculum was removed. The eye was cleaned with wet and dry gauze. A drop of of atropine, was placed on the eye followed by maxitrol ointment. An eye pad and nails shield were taped over the eye.      The patient tolerated well the procedure and was discharged to the post-operative unit in stable conditions with no complications.  The surgery was assisted by Dr. Irwin Woods. Due to the delicate and complex nature of this surgery, an assistant was required. He assisted with scleral buckle placement. I was present for the entire surgery.

## 2023-09-22 NOTE — ANESTHESIA PREPROCEDURE EVALUATION
Anesthesia Pre-Procedure Evaluation    Patient: Donna Hurtado   MRN: [id number] : 2000        Procedure : Procedure(s):  SCLERAL BUCKLING / possible cryotherapy / possible indirect laser ophthalmoscopy / possible subretinal fluid drainage          Past Medical History:   Diagnosis Date    Amblyopia     left eye    Bicuspid aortic valve 2012    Dysfunction of eustachian tube       Past Surgical History:   Procedure Laterality Date    APPENDECTOMY      ENDOSCOPY      STRABISMUS SURGERY Left     TRACHEOESOPHAGEAL FISTULA CLOSURE      ZZC APPENDECTOMY        Allergies   Allergen Reactions    Cats     Dust Mites     Nkda [No Known Drug Allergy]     Pollen Extract     Ragweeds       Social History     Tobacco Use    Smoking status: Every Day     Types: Other    Smokeless tobacco: Never   Substance Use Topics    Alcohol use: No      Wt Readings from Last 1 Encounters:   23 49 kg (108 lb)           Physical Exam    Airway        Mallampati: II       Respiratory Devices and Support         Dental           Cardiovascular          Rhythm and rate: regular and normal     Pulmonary                   OUTSIDE LABS:  CBC:   Lab Results   Component Value Date    WBC 9.5 2019    HGB 12.7 2019    HCT 37.6 2019     2019     BMP:   Lab Results   Component Value Date     2019    POTASSIUM 3.4 (L) 2019    CHLORIDE 107 2019    CO2 23 2019    BUN 6 (L) 2019    CR 0.71 2019    GLC 75 2019     COAGS: No results found for: PTT, INR, FIBR  POC:   Lab Results   Component Value Date    HCG Negative 2023     HEPATIC:   Lab Results   Component Value Date    ALBUMIN 4.6 2019    PROTTOTAL 7.7 2019    ALT 14 2019    AST 18 2019    ALKPHOS 50 2019    BILITOTAL 0.4 2019     OTHER:   Lab Results   Component Value Date    SUSHMA 9.8 2019       Anesthesia Plan    ASA Status:  2       Anesthesia Type: General.    Induction: Intravenous.           Consents    Anesthesia Plan(s) and associated risks, benefits, and realistic alternatives discussed. Questions answered and patient/representative(s) expressed understanding.     - Discussed:     - Discussed with:  Patient            Postoperative Care    Pain management: Multi-modal analgesia.   PONV prophylaxis: Ondansetron (or other 5HT-3), Dexamethasone or Solumedrol     Comments:                DENZEL DAVE MD

## 2023-09-23 ENCOUNTER — OFFICE VISIT (OUTPATIENT)
Dept: OPHTHALMOLOGY | Facility: CLINIC | Age: 23
End: 2023-09-23
Payer: COMMERCIAL

## 2023-09-23 DIAGNOSIS — H33.002 RHEGMATOGENOUS RETINAL DETACHMENT OF LEFT EYE: Primary | ICD-10-CM

## 2023-09-23 PROCEDURE — 99207 PR SERVICE NOT STAFFED W/SUPERV PROV: CPT | Performed by: OPHTHALMOLOGY

## 2023-09-23 ASSESSMENT — VISUAL ACUITY
OD_CC: 20/30
OS_CC: 20/200
OD_PH_CC: 20/20
OD_PH_CC+: -3
OS_PH_CC+: -3
OD_CC+: -2
METHOD: SNELLEN - LINEAR
OS_PH_CC: 20/50

## 2023-09-23 ASSESSMENT — EXTERNAL EXAM - LEFT EYE: OS_EXAM: NORMAL

## 2023-09-23 ASSESSMENT — EXTERNAL EXAM - RIGHT EYE: OD_EXAM: NORMAL

## 2023-09-23 ASSESSMENT — TONOMETRY
OS_IOP_MMHG: 15
IOP_METHOD: TONOPEN
OD_IOP_MMHG: 14

## 2023-09-23 ASSESSMENT — SLIT LAMP EXAM - LIDS
COMMENTS: NORMAL
COMMENTS: NORMAL

## 2023-09-23 ASSESSMENT — CUP TO DISC RATIO: OS_RATIO: 0.3

## 2023-09-23 NOTE — PROGRESS NOTES
CC: Retinal detachment  consult  First appointment with me  Referred by:     HPI: Donna Hurtado is a 23 year old year-old patient who presented with mac off RD POD1 s/p scleral buckle, subretinal fluid drainage, and cryo of left eye (9/22/2023 with Dr. Shepherd)    Patient states that she slept well. Pain is tolerable.      - Past ocular history: Ambylopia; myopia  - Surgical history: Strabismus surgery   - Contact lens wear: Occasional  - Current Eye drops: Lubricating eye drops     PMH: TMJ.  FH:  glaucoma or Age related macular degeneration.+ ulcerative colitis on mom's side   Review of systems for the eyes was negative other than the pertinent positives/negatives listed in the HPI.      Retinal Imaging:  OCT 09/21/23   RE: normal foveal contour  LE: macula subretinal fluid     optos consistent with exam  Autofluorescence: peripheral areas of hypoautofluorescence left eye   fluorescein angiography:  Normal av and choroidal filling; mild peripheral vascular leakage left eye; no vasculitis  OVF: right eye: few sup spots of decreased sensitivity  Left eye: nasal areas of decreased sensitivity    Assessment & Plan:  # Retinal detachment  Macula off left eye   Possibly chronic  S/p scleral buckle/ cryo/ subretinal fluid drainage  (9/22/2023)    Slept well  Retina attached  Doing well    Plan:  No heavy lifting   Urrutia shield at bedtime  Retina detachment and endophthalmitis precautions were discussed with the patient and was asked to return if any of the those occur    Medications to operative eye  Maxitrol (pink top) four times a day    Maxitrol oint at bedtime  Atropine (red top) once a day     Follow up in one week    #myopia both eyes  # history of amblyopia left eye   # retina hole left eye  With surrounded pigment  Recommend prophylactic laser.  Patient would like to wait until after surgery left eye        Baylee Collins MD  Resident Physician, PGY-2  Department of Ophthalmology

## 2023-09-23 NOTE — PATIENT INSTRUCTIONS
POST-OPERATIVE INSTRUCTIONS FOLLOWING RETINA SURGERY    Slime Shepherd MD  Department of Ophthalmology  [facility]  [phone]      ACTIVITY:  No heavy lifting for 2 weeks after surgery.  No swimming for 4 weeks after surgery.  It is OK for you to shower, to wash your face, and to wash your hair.  Allow the shower to hit the top of your head and wash down your face.  Do not hit your eye directly with the jet from the shower.  Keep your eye covered with the shield when you sleep for 2 weeks after surgery.  If your shield has a tab, it is designed to go over the bridge of your nose.  Place one piece of tape diagonally from the center of your forehead to the side of your cheek to secure the shield.   During the daytime, you can use either the shield or your regular eyeglasses or sunglasses to protect your eye.    EYE DROPS  Use these drops after surgery.  When using more than one drop, separate them by 3 minutes between drops.  Common times to place drops are breakfast, lunch, dinner, and bedtime.  Do not stop your drops without discussing with our office.  If you run out before your appointment, call and we will send in a refill.    ____ Atropine () 1 time per day  ____ Maxitrol 4 times per day  ____ Ointment at bedtime     WHAT TO EXPECT  It is common for the eye to to have a blood tinged discharge for a few days after surgery  It may feel irritated (as if something were in your eye), for there to be clear discharge (thicker in the mornings upon awakening), and for it to be bloodshot for 2-3 weeks following retina surgery.   You might feel itchiness, please do not rub your eye, instead it is OK to use artificial tears to minimize symptoms.      WHAT TO WATCH OUT FOR  If you experience any of the following, you should call immediately:  Increasing pain  Increasing nausea or vomiting  Increasing redness  Worsening or darkening of the vision  New flashing lights or floaters      For any of the  symptoms listed above, or for other concerns, call [phone] and ask to speak to the clinic nurse.  If you call after hours, follow to prompts to reach the doctor on call.

## 2023-09-27 ENCOUNTER — OFFICE VISIT (OUTPATIENT)
Dept: OPHTHALMOLOGY | Facility: CLINIC | Age: 23
End: 2023-09-27
Attending: OPHTHALMOLOGY
Payer: COMMERCIAL

## 2023-09-27 DIAGNOSIS — H33.002 RHEGMATOGENOUS RETINAL DETACHMENT OF LEFT EYE: Primary | ICD-10-CM

## 2023-09-27 PROCEDURE — 99024 POSTOP FOLLOW-UP VISIT: CPT | Mod: GC | Performed by: OPHTHALMOLOGY

## 2023-09-27 PROCEDURE — 99207 OCT RETINA SPECTRALIS OU (BOTH EYE): CPT | Mod: 26 | Performed by: OPHTHALMOLOGY

## 2023-09-27 PROCEDURE — 92134 CPTRZ OPH DX IMG PST SGM RTA: CPT | Performed by: OPHTHALMOLOGY

## 2023-09-27 PROCEDURE — 99213 OFFICE O/P EST LOW 20 MIN: CPT | Performed by: OPHTHALMOLOGY

## 2023-09-27 RX ORDER — ATROPINE SULFATE 10 MG/ML
1-2 SOLUTION/ DROPS OPHTHALMIC DAILY
COMMUNITY
End: 2023-11-06

## 2023-09-27 ASSESSMENT — TONOMETRY
IOP_METHOD: TONOPEN
OS_IOP_MMHG: 19
OD_IOP_MMHG: 20

## 2023-09-27 ASSESSMENT — VISUAL ACUITY
CORRECTION_TYPE: GLASSES
OS_PH_CC+: +2
OD_CC+: -2
OS_CC: 20/200
OS_PH_CC: 20/60
METHOD: SNELLEN - LINEAR
OD_CC: 20/25

## 2023-09-27 ASSESSMENT — CUP TO DISC RATIO: OS_RATIO: 0.3

## 2023-09-27 ASSESSMENT — REFRACTION_WEARINGRX
OD_CYLINDER: +0.75
OS_CYLINDER: +0.25
OS_AXIS: 115
OD_SPHERE: -2.00
OS_SPHERE: -3.00
OD_AXIS: 011

## 2023-09-27 ASSESSMENT — SLIT LAMP EXAM - LIDS
COMMENTS: NORMAL
COMMENTS: NORMAL

## 2023-09-27 ASSESSMENT — EXTERNAL EXAM - LEFT EYE: OS_EXAM: NORMAL

## 2023-09-27 ASSESSMENT — EXTERNAL EXAM - RIGHT EYE: OD_EXAM: NORMAL

## 2023-09-27 NOTE — PROGRESS NOTES
CC:Retinal detachment  repair follow up      HPI: Donna Hurtado is a 23 year old year-old patient who presented with mac off RD POD1 s/p scleral buckle, subretinal fluid drainage, and cryo of left eye (9/22/2023 with Dr. Shepherd)    Patient states that she slept well. Pain is tolerable.      - Past ocular history: Ambylopia; myopia  - Surgical history: Strabismus surgery   - Contact lens wear: Occasional  - Current Eye drops: Lubricating eye drops     PMH: TMJ.  FH:  glaucoma or Age related macular degeneration.+ ulcerative colitis on mom's side   Review of systems for the eyes was negative other than the pertinent positives/negatives listed in the HPI.      Retinal Imaging:  OCT 09/21/23   RE: normal foveal contour  LE: macula subretinal fluid     optos consistent with exam  Autofluorescence: peripheral areas of hypoautofluorescence left eye   fluorescein angiography:  Normal av and choroidal filling; mild peripheral vascular leakage left eye; no vasculitis  OVF: right eye: few sup spots of decreased sensitivity  Left eye: nasal areas of decreased sensitivity    Assessment & Plan:  # Retinal detachment  Macula off left eye   Possibly chronic  S/p scleral buckle/ cryo/ subretinal fluid drainage  (9/22/2023)  Retina attached posteriorly   Improved subretinal fluid in the macula area- persistent subretinal fluid inferior temporal periphery  observe    Plan:  No heavy lifting   Urrutia shield at bedtime  Retina detachment and endophthalmitis precautions were discussed with the patient and was asked to return if any of the those occur    Medications to operative eye  Maxitrol (pink top) Three times a day x 1 week, then twice a day x 1 week and then once a day till finish    Maxitrol oint at bedtime  Atropine (red top) stop     Follow up in 2 weeks with Optical Coherence Tomography and optos    #myopia both eyes  # history of amblyopia left eye   # retina hole left eye  With surrounded pigment  Recommend prophylactic  laser.  Patient would like to wait until after surgery left eye        Marco Sotelo MD  Resident Physician, PGY-3  Department of Ophthalmology    ~~~~~~~~~~~~~~~~~~~~~~~~~~~~~~~~~~   Complete documentation of historical and exam elements from today's encounter can be found in the full encounter summary report (not reduplicated in this progress note).  I personally obtained the chief complaint(s) and history of present illness.  I confirmed and edited as necessary the review of systems, past medical/surgical history, family history, social history, and examination findings as documented by others; and I examined the patient myself.  I personally reviewed the relevant tests, images, and reports as documented above.  I personally reviewed the ophthalmic test(s) associated with this encounter, agree with the interpretation(s) as documented by the resident/fellow, and have edited the corresponding report(s) as necessary.   I formulated and edited as necessary the assessment and plan and discussed the findings and management plan with the patient and family    Slime Shepherd MD  Professor of Ophthalmology  Vitreo-Retinal surgeon   Department of Ophthalmology and Visual Neurosciences   [facility]  Phone: [phone]   Fax: [phone]

## 2023-09-27 NOTE — PATIENT INSTRUCTIONS
No heavy lifting   Urrutia shield at bedtime  Retina detachment and endophthalmitis precautions were discussed with the patient and was asked to return if any of the those occur    Medications to operative eye  Maxitrol (pink top) Three times a day x 1 week, then twice a day x 1 week and then once a day till finish    Maxitrol oint at bedtime  Atropine (red top) stop     Follow up in 2 weeks with Optical Coherence Tomography and optos

## 2023-09-27 NOTE — NURSING NOTE
Chief Complaints and History of Present Illnesses   Patient presents with    Post Op (Ophthalmology) Left Eye     Chief Complaint(s) and History of Present Illness(es)       Post Op (Ophthalmology) Left Eye               Comments    POP s/p scleral buckle, subretinal fluid drainage, and cryo of left eye 09/22/2023.    The patient is managing pain with 3G Tylenol daily.  She notes she is very light sensitive and she has been keeping her house dark.   The patient is using Maxitrol four times daily, Maxitrol ointment at bedtime and Atropine daily.  She has questions about her ability to open her left eye.  She also has dry eyes.  Sera Young, COA, COA 2:43 PM 09/27/2023

## 2023-09-29 ENCOUNTER — TELEPHONE (OUTPATIENT)
Dept: OPHTHALMOLOGY | Facility: CLINIC | Age: 23
End: 2023-09-29
Payer: COMMERCIAL

## 2023-09-29 ENCOUNTER — OFFICE VISIT (OUTPATIENT)
Dept: OPHTHALMOLOGY | Facility: CLINIC | Age: 23
End: 2023-09-29
Attending: OPHTHALMOLOGY
Payer: COMMERCIAL

## 2023-09-29 DIAGNOSIS — H33.002 RHEGMATOGENOUS RETINAL DETACHMENT OF LEFT EYE: ICD-10-CM

## 2023-09-29 DIAGNOSIS — H33.002 RHEGMATOGENOUS RETINAL DETACHMENT OF LEFT EYE: Primary | ICD-10-CM

## 2023-09-29 PROCEDURE — 92134 CPTRZ OPH DX IMG PST SGM RTA: CPT | Performed by: OPHTHALMOLOGY

## 2023-09-29 PROCEDURE — 99024 POSTOP FOLLOW-UP VISIT: CPT | Mod: GC | Performed by: OPHTHALMOLOGY

## 2023-09-29 PROCEDURE — 99213 OFFICE O/P EST LOW 20 MIN: CPT | Mod: 25 | Performed by: OPHTHALMOLOGY

## 2023-09-29 PROCEDURE — 92250 FUNDUS PHOTOGRAPHY W/I&R: CPT | Performed by: OPHTHALMOLOGY

## 2023-09-29 PROCEDURE — 99207 FUNDUS PHOTOS OU (BOTH EYES): CPT | Mod: 26 | Performed by: OPHTHALMOLOGY

## 2023-09-29 ASSESSMENT — SLIT LAMP EXAM - LIDS: COMMENTS: NORMAL

## 2023-09-29 ASSESSMENT — CONF VISUAL FIELD
OS_SUPERIOR_TEMPORAL_RESTRICTION: 0
OD_INFERIOR_TEMPORAL_RESTRICTION: 0
OS_INFERIOR_TEMPORAL_RESTRICTION: 0
OS_NORMAL: 1
OD_INFERIOR_NASAL_RESTRICTION: 0
OS_INFERIOR_NASAL_RESTRICTION: 0
OD_SUPERIOR_TEMPORAL_RESTRICTION: 0
OD_NORMAL: 1
OS_SUPERIOR_NASAL_RESTRICTION: 0
OD_SUPERIOR_NASAL_RESTRICTION: 0
METHOD: COUNTING FINGERS

## 2023-09-29 ASSESSMENT — VISUAL ACUITY
OD_PH_CC: 20/25
METHOD: SNELLEN - LINEAR
OS_PH_CC+: -1
OD_CC: 20/40
OS_PH_CC: 20/60
CORRECTION_TYPE: GLASSES
OS_CC: 20/200
OD_PH_CC+: +2

## 2023-09-29 ASSESSMENT — EXTERNAL EXAM - LEFT EYE: OS_EXAM: NORMAL

## 2023-09-29 ASSESSMENT — CUP TO DISC RATIO: OS_RATIO: 0.3

## 2023-09-29 ASSESSMENT — TONOMETRY
IOP_METHOD: TONOPEN
OD_IOP_MMHG: 19
OS_IOP_MMHG: 19

## 2023-09-29 ASSESSMENT — EXTERNAL EXAM - RIGHT EYE: OD_EXAM: NORMAL

## 2023-09-29 NOTE — PROGRESS NOTES
"CC -   Post Op OS    INTERVAL HISTORY - Initial visit with me, had some new vision disturbance and shadow ST periphery, no pain, no diplopia    Patient notes she was having flashing in her left eye since last night. \"It looks like a halo. I notice it when I blink. This is the most I have been able to open my left eye since surgery.\"   s/p scleral buckle, subretinal fluid drainage, and cryo of left eye 09/22/2023.     Patient notes her glasses put pressure on the left side of her nose. \"I get some of my eye pain from there.\"       [facility] -   Donna Hurtado is a  23 year old year-old patient with history of mac off RRD OS noted 9/21/23 with symptoms of blurriness and VFD noted 1 month prior     - Past ocular history: Ambylopia OS; myopia  - Contact lens wear: Occasional      PAST OCULAR SURGERY  strabismus surgery childhood  SBP #220/240/270 OS 9/22/23 ()    RETINAL IMAGING:  OCT 09/29/23  OD -  macula - retina normal, PHF attached  OS -  macula - tr SRF better than pre-op, PHF attached   Periphery - RD      ASSESSMENT & PLAN      # POW #1 OS   - s/p SBP 9/22/23 for RD repair   - IOP OK, no infection   - persistent IT peripheral RD, appears convex   - only trace fluid in macula today, better than pre-op     - concern for peripheral area, no clear etiology   - given macular improvement monitor closely   - recheck 1 week   - may need PPV/FGx vs SO if not improving      # Amblyopia OS   - s/p strabismus surgery childhodd          return to clinic: 1 week with , OCT OS        Ash Marques MD MPH  Vitreoretinal Fellow PGY-6  [facility]           ATTESTATION     Attending Attestation:     Complete documentation of historical and exam elements from today's encounter can be found in the full encounter summary report (not reduplicated in this progress note).  I personally obtained the chief complaint(s) and history of present illness.  I confirmed and edited as necessary the review of systems, past " medical/surgical history, family history, social history, and examination findings as documented by others; and I examined the patient myself.  I personally reviewed the relevant tests, images, and reports as documented above.  I personally reviewed the ophthalmic test(s) associated with this encounter, agree with the interpretation(s) as documented by the resident/fellow, and have edited the corresponding report(s) as necessary.   I formulated and edited as necessary the assessment and plan and discussed the findings and management plan with the patient and family    Malinda Graham MD, PhD  , Vitreoretinal Surgery  Department of Ophthalmology  [facility]

## 2023-09-29 NOTE — NURSING NOTE
"Chief Complaints and History of Present Illnesses   Patient presents with    Flashes Evaluation     Patient notes she was having flashing in her left eye since last night. \"It looks like a halo. I notice it when I blink. This is the most I have been able to open my left eye since surgery.\"   s/p scleral buckle, subretinal fluid drainage, and cryo of left eye 09/22/2023.     Patient notes her glasses put pressure on the left side of her nose. \"I get some of my eye pain from there.\"      Chief Complaint(s) and History of Present Illness(es)       Flashes Evaluation              Laterality: left eye    Duration: hours    Associated symptoms: eye pain (a little, but I haven't taken any since yesterday) and blurred vision.  Negative for floaters, shade, peripheral vision loss and color vision changes    Comments: Patient notes she was having flashing in her left eye since last night. \"It looks like a halo. I notice it when I blink. This is the most I have been able to open my left eye since surgery.\"   s/p scleral buckle, subretinal fluid drainage, and cryo of left eye 09/22/2023.     Patient notes her glasses put pressure on the left side of her nose. \"I get some of my eye pain from there.\"               Comments    Ocular meds:   - Maxitrol gtts TID right eye  - Maxitrol oint at bedtime right eye   Stopped Atropine 2 days ago.     Kim Barr, BARBIE 10:55 AM 09/29/2023                     " PAST MEDICAL HISTORY:  Anxiety     Asthma     Depression     DM (diabetes mellitus)     Hip pain, chronic, left     HTN (hypertension)

## 2023-09-29 NOTE — TELEPHONE ENCOUNTER
[facility] Call Center    Phone Message    May a detailed message be left on voicemail: yes     Reason for Call: Symptoms or Concerns     If patient has red-flag symptoms, warm transfer to triage line    Current symptom or concern: Pt had flashers yesterday that started in the afternoon that happened off and on into the evening. She has not had the flashing today but has cloudiness where the flashing was     Symptoms have been present for:  1 day(s)    Has patient previously been seen for this? No    By : NA    Date: NA    Are there any new or worsening symptoms? No    Action Taken: Message routed to:  [facility] ([facility] eye    Travel Screening: Not Applicable

## 2023-10-03 NOTE — TELEPHONE ENCOUNTER
Scheduled with Dr. Graham this morning    Pt able to arrive around 1030.    S/p scleral buckling-- new blurry area in vision/flashing last night    Kendall Santiago RN 9:01 AM 09/29/23     Qbrexza Counseling:  I discussed with the patient the risks of Qbrexza including but not limited to headache, mydriasis, blurred vision, dry eyes, nasal dryness, dry mouth, dry throat, dry skin, urinary hesitation, and constipation.  Local skin reactions including erythema, burning, stinging, and itching can also occur.

## 2023-10-11 ENCOUNTER — OFFICE VISIT (OUTPATIENT)
Dept: OPHTHALMOLOGY | Facility: CLINIC | Age: 23
End: 2023-10-11
Attending: OPHTHALMOLOGY
Payer: COMMERCIAL

## 2023-10-11 DIAGNOSIS — H33.002 RHEGMATOGENOUS RETINAL DETACHMENT OF LEFT EYE: ICD-10-CM

## 2023-10-11 DIAGNOSIS — H33.002 RHEGMATOGENOUS RETINAL DETACHMENT OF LEFT EYE: Primary | ICD-10-CM

## 2023-10-11 PROCEDURE — 92134 CPTRZ OPH DX IMG PST SGM RTA: CPT | Performed by: OPHTHALMOLOGY

## 2023-10-11 PROCEDURE — 99024 POSTOP FOLLOW-UP VISIT: CPT | Performed by: OPHTHALMOLOGY

## 2023-10-11 PROCEDURE — 99207 OCT RETINA SPECTRALIS OS (LEFT EYE): CPT | Mod: 26 | Performed by: OPHTHALMOLOGY

## 2023-10-11 PROCEDURE — 99213 OFFICE O/P EST LOW 20 MIN: CPT | Performed by: OPHTHALMOLOGY

## 2023-10-11 ASSESSMENT — TONOMETRY
OD_IOP_MMHG: 17
OS_IOP_MMHG: 20
OS_IOP_MMHG: 23
IOP_METHOD: TONOPEN
IOP_METHOD: TONOPEN

## 2023-10-11 ASSESSMENT — REFRACTION_WEARINGRX
OS_SPHERE: -3.00
OS_AXIS: 115
OD_AXIS: 011
SPECS_TYPE: SVL
OS_CYLINDER: +0.25
OD_CYLINDER: +0.75
OD_SPHERE: -2.00

## 2023-10-11 ASSESSMENT — VISUAL ACUITY
CORRECTION_TYPE: GLASSES
OS_PH_CC+: -2
METHOD: SNELLEN - LINEAR
OD_PH_CC: 20/20
OS_PH_CC: 20/50
OD_CC+: -2
OD_PH_CC+: -3
OS_CC: 20/300
OD_CC: 20/40

## 2023-10-11 ASSESSMENT — CUP TO DISC RATIO: OS_RATIO: 0.3

## 2023-10-11 ASSESSMENT — CONF VISUAL FIELD
OS_INFERIOR_TEMPORAL_RESTRICTION: 0
METHOD: COUNTING FINGERS
OD_SUPERIOR_TEMPORAL_RESTRICTION: 0
OS_INFERIOR_NASAL_RESTRICTION: 0
OS_NORMAL: 1
OD_SUPERIOR_NASAL_RESTRICTION: 0
OD_NORMAL: 1
OS_SUPERIOR_TEMPORAL_RESTRICTION: 0
OD_INFERIOR_TEMPORAL_RESTRICTION: 0
OS_SUPERIOR_NASAL_RESTRICTION: 0
OD_INFERIOR_NASAL_RESTRICTION: 0

## 2023-10-11 ASSESSMENT — EXTERNAL EXAM - RIGHT EYE: OD_EXAM: NORMAL

## 2023-10-11 ASSESSMENT — EXTERNAL EXAM - LEFT EYE: OS_EXAM: NORMAL

## 2023-10-11 ASSESSMENT — SLIT LAMP EXAM - LIDS: COMMENTS: NORMAL

## 2023-10-11 NOTE — PROGRESS NOTES
CC -   Post Op OS    INTERVAL HISTORY -s/p scleral buckle, subretinal fluid drainage, and cryo of left eye 09/22/2023. Reports improvement of the vision; no shadows; no flashes and floaters       [facility] -   Donna Hurtado is a  23 year old year-old patient with history of mac off RRD OS noted 9/21/23 with symptoms of blurriness and VFD noted 1 month prior     - Past ocular history: Ambylopia OS; myopia  - Contact lens wear: Occasional      PAST OCULAR SURGERY  strabismus surgery childhood  SBP #220/240/270 OS 9/22/23 ([facility])    RETINAL IMAGING:  OCT 09/29/23  OD -  macula - retina normal, PHF attached  OS -  macula - tr SRF better than pre-op, PHF attached   Periphery - mild srf      ASSESSMENT & PLAN      # - s/p SBP 9/22/23 for RD repair   - IOP OK, no infection   - only trace fluid in macula today, better than pre-op   - retina attached    # Amblyopia OS   - s/p strabismus surgery childhodd    return to clinic: 3 weeks with [facility], optos and OCT left eye   artificial tears  As needed     ~~~~~~~~~~~~~~~~~~~~~~~~~~~~~~~~~~   Complete documentation of historical and exam elements from today's encounter can be found in the full encounter summary report (not reduplicated in this progress note).  I personally obtained the chief complaint(s) and history of present illness.  I confirmed and edited as necessary the review of systems, past medical/surgical history, family history, social history, and examination findings as documented by others; and I examined the patient myself.  I personally reviewed the relevant tests, images, and reports as documented above.  I formulated and edited as necessary the assessment and plan and discussed the findings and management plan with the patient and family    Slime Shepherd MD  Professor of Ophthalmology  Vitreo-Retinal surgeon   Department of Ophthalmology and Visual Neurosciences   [facility]  Phone: [phone]   Fax: [phone]

## 2023-10-11 NOTE — NURSING NOTE
Chief Complaints and History of Present Illnesses   Patient presents with    Post Op (Ophthalmology) Left Eye     POW3 s/p RD repair left eye (9/22/23)     Chief Complaint(s) and History of Present Illness(es)       Post Op (Ophthalmology) Left Eye              Laterality: left eye    Associated symptoms: double vision, flashes and floaters.  Negative for dryness, eye pain and redness    Treatments tried: eye drops    Pain scale: 0/10    Comments: POW3 s/p RD repair left eye (9/22/23)              Comments    She feels like left eye is healing well.   Pt would like FMLA forms filled out today.   Occasional nausea, diplopia with atropine dilation.   Constant flashing floater left eye.    Juan A Bañuelos 2:50 PM October 11, 2023

## 2023-11-02 ENCOUNTER — OFFICE VISIT (OUTPATIENT)
Dept: OPHTHALMOLOGY | Facility: CLINIC | Age: 23
End: 2023-11-02
Attending: OPHTHALMOLOGY
Payer: COMMERCIAL

## 2023-11-02 DIAGNOSIS — H33.002 RHEGMATOGENOUS RETINAL DETACHMENT OF LEFT EYE: Primary | ICD-10-CM

## 2023-11-02 PROCEDURE — 92250 FUNDUS PHOTOGRAPHY W/I&R: CPT | Performed by: OPHTHALMOLOGY

## 2023-11-02 PROCEDURE — 92134 CPTRZ OPH DX IMG PST SGM RTA: CPT | Performed by: OPHTHALMOLOGY

## 2023-11-02 PROCEDURE — 99213 OFFICE O/P EST LOW 20 MIN: CPT | Performed by: OPHTHALMOLOGY

## 2023-11-02 PROCEDURE — 99207 FUNDUS PHOTOS OS (LEFT EYE): CPT | Mod: 26 | Performed by: OPHTHALMOLOGY

## 2023-11-02 PROCEDURE — 99024 POSTOP FOLLOW-UP VISIT: CPT | Performed by: OPHTHALMOLOGY

## 2023-11-02 ASSESSMENT — CUP TO DISC RATIO
OS_RATIO: 0.3
OD_RATIO: 0.3

## 2023-11-02 ASSESSMENT — REFRACTION_WEARINGRX
OS_AXIS: 115
OD_CYLINDER: +0.75
OD_SPHERE: -2.00
OD_AXIS: 011
SPECS_TYPE: SVL
OS_CYLINDER: +0.25
OS_SPHERE: -3.00

## 2023-11-02 ASSESSMENT — VISUAL ACUITY
OS_PH_CC+: +2
METHOD: SNELLEN - LINEAR
OD_CC: 20/40
CORRECTION_TYPE: GLASSES
OS_CC: 20/250
OD_PH_CC: 20/25
OS_PH_CC: 20/60

## 2023-11-02 ASSESSMENT — SLIT LAMP EXAM - LIDS
COMMENTS: NORMAL
COMMENTS: NORMAL

## 2023-11-02 ASSESSMENT — TONOMETRY
OD_IOP_MMHG: 17
OS_IOP_MMHG: 21
IOP_METHOD: TONOPEN

## 2023-11-02 ASSESSMENT — EXTERNAL EXAM - LEFT EYE: OS_EXAM: NORMAL

## 2023-11-02 ASSESSMENT — EXTERNAL EXAM - RIGHT EYE: OD_EXAM: NORMAL

## 2023-11-02 NOTE — Clinical Note
[facility] place case request for  scleral buckle revision/ possible cryotherapy/ Possible 25 g Pars plana vitrectomy / endolaser/ air fluid exchange/ gas vs oil left eye   Gareth I'll call you tomorrow Thanks  Surgeon procedure time:  120 min General anesthesia and peribulbar block Urgency of Surgery: in the next few days Post-op apps needed: 1day; 1 week; 3 weeks Multi surgeon case: No  H&P completed by primary care physician or PAC  needed: no

## 2023-11-02 NOTE — PROGRESS NOTES
CC -   Post Op OS    INTERVAL HISTORY -s/p scleral buckle, subretinal fluid drainage, and cryo of left eye 09/22/2023.  Reports new inferior scotoma since yesterday    PMH - Donna Hurtado is a  23 year old year-old patient with history of mac off RRD OS noted 9/21/23 with symptoms of blurriness and VFD noted 1 month prior  - Past ocular history: Ambylopia OS; myopia  - Contact lens wear: Occasional      PAST OCULAR SURGERY  strabismus surgery childhood  SBP #220/240/270 OS 9/22/23 ()    RETINAL IMAGING:  OCT 11/02/23   OD -  macula - retina normal, PHF attached  OS -  macula - mild SRF better than pre-op   Periphery - mild srf      ASSESSMENT & PLAN    # - s/p SBP 9/22/23 for RD repair left eye    - IOP OK, no infection   - only trace fluid in macula, better than pre-op   - retina attached last eye examination     #operc hole superiorly with new associated localized Retinal detachment left eye   Symptoms started yesterday.  Previously noted operculated hole over the scleral buckle and retina was attached.    Recommend: scleral buckle revision/ possible cryotherapy/ Possible 25 g Pars plana vitrectomy / endolaser/ air fluid exchange/ gas vs oil left eye     Surgeon procedure time:  120 min  Urgency of Surgery: in the next few days  Post-op apps needed: 1day; 1 week; 3 weeks  Multi surgeon case: No   H&P completed by primary care physician or PAC   needed: no   General anesthesia and peribulbar block    Risks, benefits and alternatives discussed with patient: 1:1000 risk of infection/bleed/ further loss of vision; 1:100 risk of Retinal detachment and need for further surgery.   Retinal detachments can lead to vision loss despite surgery.  If you have not had cataract surgery yet, retinal surgery often accelerates the development of cataracts, requiring cataract surgery to be performed a few months after the retina surgery. Patient aware of prolonged healing after retinal surgery (up to a year after  surgery) as well as possibility of air/gas/SO  instillation into eye. Instillation of those might necessitate head positions like continuous face down positioning, which would be required for up to 7 days after surgery.  If a gas bubble is placed, both air travel and ground travel to elevated altitudes would be prohibited for up to 2-3 months afterwards. This is a training facility and residents or fellows may be involved in aspects of your surgery while under my direct observation.  Patient agreed to proceed with surgery.     # history of Amblyopia OS   - s/p strabismus surgery childhodd    return to clinic: Monday 7:30 am with optos and OCT left eye   Patient will return tomorrow if symptoms worsen, she prefers not to have surgery over the weekend if possible  artificial tears  As needed     ~~~~~~~~~~~~~~~~~~~~~~~~~~~~~~~~~~   Complete documentation of historical and exam elements from today's encounter can be found in the full encounter summary report (not reduplicated in this progress note).  I personally obtained the chief complaint(s) and history of present illness.  I confirmed and edited as necessary the review of systems, past medical/surgical history, family history, social history, and examination findings as documented by others; and I examined the patient myself.  I personally reviewed the relevant tests, images, and reports as documented above.  I formulated and edited as necessary the assessment and plan and discussed the findings and management plan with the patient and family    Slime Shepherd MD  Professor of Ophthalmology  Vitreo-Retinal surgeon   Department of Ophthalmology and Visual Neurosciences   [facility]  Phone: [phone]   Fax: [phone]

## 2023-11-02 NOTE — NURSING NOTE
Chief Complaints and History of Present Illnesses   Patient presents with    Post Op (Ophthalmology) Left Eye     Chief Complaint(s) and History of Present Illness(es)       Post Op (Ophthalmology) Left Eye              Laterality: left eye    Onset: 3 weeks ago              Comments    Pt. States that she has been seeing a halo in LE for the last day. No flashes or floaters BE. No change in VA BE.   Mariella Heidy COT 2:47 PM November 2, 2023

## 2023-11-03 ENCOUNTER — DOCUMENTATION ONLY (OUTPATIENT)
Dept: OPHTHALMOLOGY | Facility: CLINIC | Age: 23
End: 2023-11-03
Payer: COMMERCIAL

## 2023-11-03 DIAGNOSIS — H33.22 LEFT RETINAL DETACHMENT: Primary | ICD-10-CM

## 2023-11-03 NOTE — PROGRESS NOTES
Patient is schedule for surgery with: Dr. Shepherd  Patient was contacted by Dr. Irwin Vasquez    Surgery Date: 11/7     [facility]    H&P: to be completed by Primary Care team, or surgeon will complete on day of surgery     Post-op: 11/8, 11/15, and 12/6    Patient will receive a phone call from pre-admission nurses 1-2 days prior to surgery with arrival time and NPO instructions.    Patient aware times are subject to change up until day before surgery.     Patient questions/concerns: N/A     Surgery packet was sent via [employer] on 11/3/2023 at 3:52 PM

## 2023-11-05 ENCOUNTER — ANESTHESIA EVENT (OUTPATIENT)
Dept: SURGERY | Facility: AMBULATORY SURGERY CENTER | Age: 23
End: 2023-11-05
Payer: COMMERCIAL

## 2023-11-06 ENCOUNTER — OFFICE VISIT (OUTPATIENT)
Dept: OPHTHALMOLOGY | Facility: CLINIC | Age: 23
End: 2023-11-06
Attending: OPHTHALMOLOGY
Payer: COMMERCIAL

## 2023-11-06 DIAGNOSIS — H33.22 LEFT RETINAL DETACHMENT: Primary | ICD-10-CM

## 2023-11-06 PROCEDURE — 99024 POSTOP FOLLOW-UP VISIT: CPT | Mod: GC | Performed by: OPHTHALMOLOGY

## 2023-11-06 PROCEDURE — 99214 OFFICE O/P EST MOD 30 MIN: CPT | Performed by: OPHTHALMOLOGY

## 2023-11-06 RX ORDER — LEVONORGESTREL 52 MG/1
1 INTRAUTERINE DEVICE INTRAUTERINE
COMMUNITY
Start: 2023-09-12

## 2023-11-06 ASSESSMENT — VISUAL ACUITY
OD_PH_CC+: +1
OD_PH_CC: 20/25
OD_CC: 20/40
OS_PH_CC: 20/60
CORRECTION_TYPE: GLASSES
OS_CC: 20/200
METHOD: SNELLEN - LINEAR

## 2023-11-06 ASSESSMENT — EXTERNAL EXAM - LEFT EYE: OS_EXAM: NORMAL

## 2023-11-06 ASSESSMENT — REFRACTION_WEARINGRX
OS_CYLINDER: +0.25
OD_CYLINDER: +0.75
OD_AXIS: 011
OS_AXIS: 115
OS_SPHERE: -3.00
OD_SPHERE: -2.00
SPECS_TYPE: SVL

## 2023-11-06 ASSESSMENT — CUP TO DISC RATIO
OS_RATIO: 0.3
OD_RATIO: 0.3

## 2023-11-06 ASSESSMENT — TONOMETRY
OD_IOP_MMHG: 17
OS_IOP_MMHG: 18
IOP_METHOD: TONOPEN

## 2023-11-06 ASSESSMENT — SLIT LAMP EXAM - LIDS
COMMENTS: NORMAL
COMMENTS: NORMAL

## 2023-11-06 ASSESSMENT — EXTERNAL EXAM - RIGHT EYE: OD_EXAM: NORMAL

## 2023-11-06 NOTE — PROGRESS NOTES
CC -   Post Op OS    INTERVAL HISTORY -s/p scleral buckle, subretinal fluid drainage, and cryo of left eye 09/22/2023.  Reports new inferior scotoma since last week    PMH - Donna Hurtado is a  23 year old year-old patient with history of mac off RRD OS noted 9/21/23 with symptoms of blurriness and VFD noted 1 month prior  - Past ocular history: Ambylopia OS; myopia  - Contact lens wear: Occasional      PAST OCULAR SURGERY  strabismus surgery childhood  SBP #220/240/270 OS 9/22/23 ()    RETINAL IMAGING:  OCT 11/02/23   OD -  macula - retina normal, PHF attached  OS -  macula - mild SRF better than pre-op   Periphery - mild srf      ASSESSMENT & PLAN    # - s/p SBP 9/22/23 for RD repair left eye    - IOP OK, no infection   - only trace fluid in macula, better than pre-op   - retina attached last eye examination     #operc hole superiorly with new associated localized Retinal detachment left eye   Symptoms started last week.  Previously noted operculated hole over the scleral buckle and retina was attached.  Stable Retinal detachment without  progression    Recommend: scleral buckle revision/ possible cryotherapy/ Possible 25 g Pars plana vitrectomy / endolaser/ air fluid exchange/ gas vs oil left eye     Surgeon procedure time:  120 min  Urgency of Surgery: in the next few days  Post-op apps needed: 1day; 1 week; 3 weeks  Multi surgeon case: No   H&P completed by primary care physician or PAC   needed: no   General anesthesia and peribulbar block    Risks, benefits and alternatives discussed with patient: 1:1000 risk of infection/bleed/ further loss of vision; 1:100 risk of Retinal detachment and need for further surgery.   Retinal detachments can lead to vision loss despite surgery.  If you have not had cataract surgery yet, retinal surgery often accelerates the development of cataracts, requiring cataract surgery to be performed a few months after the retina surgery. Patient aware of prolonged  healing after retinal surgery (up to a year after surgery) as well as possibility of air/gas/SO  instillation into eye. Instillation of those might necessitate head positions like continuous face down positioning, which would be required for up to 7 days after surgery.  If a gas bubble is placed, both air travel and ground travel to elevated altitudes would be prohibited for up to 2-3 months afterwards. This is a training facility and residents or fellows may be involved in aspects of your surgery while under my direct observation.  Patient agreed to proceed with surgery.     # history of Amblyopia OS   - s/p strabismus surgery childhodd    return to clinic: POD1  Patient will return tomorrow if symptoms worsen, she prefers not to have surgery over the weekend if possible  artificial tears  As needed     ~~~~~~~~~~~~~~~~~~~~~~~~~~~~~~~~~~   Complete documentation of historical and exam elements from today's encounter can be found in the full encounter summary report (not reduplicated in this progress note).  I personally obtained the chief complaint(s) and history of present illness.  I confirmed and edited as necessary the review of systems, past medical/surgical history, family history, social history, and examination findings as documented by others; and I examined the patient myself.  I personally reviewed the relevant tests, images, and reports as documented above.  I formulated and edited as necessary the assessment and plan and discussed the findings and management plan with the patient and family    Slime Shepherd MD  Professor of Ophthalmology  Vitreo-Retinal surgeon   Department of Ophthalmology and Visual Neurosciences   [facility]  Phone: [phone]   Fax: [phone]

## 2023-11-06 NOTE — NURSING NOTE
"Chief Complaints and History of Present Illnesses   Patient presents with    Follow Up     3 day follow up s/p SBP 9/22/23 for RD repair left eye      Chief Complaint(s) and History of Present Illness(es)       Follow Up              Comments: 3 day follow up s/p SBP 9/22/23 for RD repair left eye               Comments    Pt states vision in LE has improved slightly since last visit. Pt seeing less of a \"halo\" in her LE. No new flashes or floaters.  No new redness or dryness.    POONAM Mccormick November 6, 2023 7:40 AM                         "

## 2023-11-07 ENCOUNTER — ANESTHESIA (OUTPATIENT)
Dept: SURGERY | Facility: AMBULATORY SURGERY CENTER | Age: 23
End: 2023-11-07
Payer: COMMERCIAL

## 2023-11-07 ENCOUNTER — HOSPITAL ENCOUNTER (OUTPATIENT)
Facility: AMBULATORY SURGERY CENTER | Age: 23
Discharge: HOME | End: 2023-11-07
Attending: OPHTHALMOLOGY
Payer: COMMERCIAL

## 2023-11-07 VITALS
WEIGHT: 108 LBS | TEMPERATURE: 98 F | HEIGHT: 64 IN | DIASTOLIC BLOOD PRESSURE: 61 MMHG | RESPIRATION RATE: 16 BRPM | HEART RATE: 71 BPM | OXYGEN SATURATION: 96 % | SYSTOLIC BLOOD PRESSURE: 96 MMHG | BODY MASS INDEX: 18.44 KG/M2

## 2023-11-07 DIAGNOSIS — H33.002 RHEGMATOGENOUS RETINAL DETACHMENT OF LEFT EYE: Primary | ICD-10-CM

## 2023-11-07 PROCEDURE — 67108 REPAIR DETACHED RETINA: CPT | Mod: LT

## 2023-11-07 PROCEDURE — 67108 REPAIR DETACHED RETINA: CPT | Mod: 58 | Performed by: OPHTHALMOLOGY

## 2023-11-07 PROCEDURE — 81025 URINE PREGNANCY TEST: CPT | Performed by: PATHOLOGY

## 2023-11-07 RX ORDER — ACETAMINOPHEN 325 MG/1
975 TABLET ORAL ONCE
Status: COMPLETED | OUTPATIENT
Start: 2023-11-07 | End: 2023-11-07

## 2023-11-07 RX ORDER — PROPOFOL 10 MG/ML
INJECTION, EMULSION INTRAVENOUS PRN
Status: DISCONTINUED | OUTPATIENT
Start: 2023-11-07 | End: 2023-11-07

## 2023-11-07 RX ORDER — TETRACAINE HYDROCHLORIDE 5 MG/ML
SOLUTION OPHTHALMIC PRN
Status: DISCONTINUED | OUTPATIENT
Start: 2023-11-07 | End: 2023-11-07 | Stop reason: HOSPADM

## 2023-11-07 RX ORDER — ONDANSETRON 2 MG/ML
4 INJECTION INTRAMUSCULAR; INTRAVENOUS EVERY 30 MIN PRN
Status: DISCONTINUED | OUTPATIENT
Start: 2023-11-07 | End: 2023-11-07 | Stop reason: HOSPADM

## 2023-11-07 RX ORDER — ACETAMINOPHEN 325 MG/1
975 TABLET ORAL ONCE
Status: DISCONTINUED | OUTPATIENT
Start: 2023-11-07 | End: 2023-11-09 | Stop reason: HOSPADM

## 2023-11-07 RX ORDER — OXYCODONE HYDROCHLORIDE 5 MG/1
5 TABLET ORAL
Status: DISCONTINUED | OUTPATIENT
Start: 2023-11-07 | End: 2023-11-09 | Stop reason: HOSPADM

## 2023-11-07 RX ORDER — FENTANYL CITRATE 50 UG/ML
INJECTION, SOLUTION INTRAMUSCULAR; INTRAVENOUS PRN
Status: DISCONTINUED | OUTPATIENT
Start: 2023-11-07 | End: 2023-11-07

## 2023-11-07 RX ORDER — DEXAMETHASONE SODIUM PHOSPHATE 4 MG/ML
INJECTION, SOLUTION INTRA-ARTICULAR; INTRALESIONAL; INTRAMUSCULAR; INTRAVENOUS; SOFT TISSUE PRN
Status: DISCONTINUED | OUTPATIENT
Start: 2023-11-07 | End: 2023-11-07 | Stop reason: HOSPADM

## 2023-11-07 RX ORDER — ATROPINE SULFATE 10 MG/ML
SOLUTION/ DROPS OPHTHALMIC PRN
Status: DISCONTINUED | OUTPATIENT
Start: 2023-11-07 | End: 2023-11-07 | Stop reason: HOSPADM

## 2023-11-07 RX ORDER — NEOMYCIN POLYMYXIN B SULFATES AND DEXAMETHASONE 3.5; 10000; 1 MG/ML; [USP'U]/ML; MG/ML
1 SUSPENSION/ DROPS OPHTHALMIC 4 TIMES DAILY
Qty: 5 ML | Refills: 0 | Status: SHIPPED | OUTPATIENT
Start: 2023-11-07 | End: 2023-11-15

## 2023-11-07 RX ORDER — LIDOCAINE HYDROCHLORIDE 20 MG/ML
INJECTION, SOLUTION INFILTRATION; PERINEURAL PRN
Status: DISCONTINUED | OUTPATIENT
Start: 2023-11-07 | End: 2023-11-07

## 2023-11-07 RX ORDER — SODIUM CHLORIDE, SODIUM LACTATE, POTASSIUM CHLORIDE, CALCIUM CHLORIDE 600; 310; 30; 20 MG/100ML; MG/100ML; MG/100ML; MG/100ML
INJECTION, SOLUTION INTRAVENOUS CONTINUOUS
Status: DISCONTINUED | OUTPATIENT
Start: 2023-11-07 | End: 2023-11-07 | Stop reason: HOSPADM

## 2023-11-07 RX ORDER — HYDROMORPHONE HYDROCHLORIDE 1 MG/ML
0.4 INJECTION, SOLUTION INTRAMUSCULAR; INTRAVENOUS; SUBCUTANEOUS EVERY 5 MIN PRN
Status: DISCONTINUED | OUTPATIENT
Start: 2023-11-07 | End: 2023-11-07 | Stop reason: HOSPADM

## 2023-11-07 RX ORDER — LIDOCAINE 40 MG/G
CREAM TOPICAL
Status: DISCONTINUED | OUTPATIENT
Start: 2023-11-07 | End: 2023-11-07 | Stop reason: HOSPADM

## 2023-11-07 RX ORDER — PROPARACAINE HYDROCHLORIDE 5 MG/ML
1 SOLUTION/ DROPS OPHTHALMIC ONCE
Status: COMPLETED | OUTPATIENT
Start: 2023-11-07 | End: 2023-11-07

## 2023-11-07 RX ORDER — EPHEDRINE SULFATE 50 MG/ML
INJECTION, SOLUTION INTRAMUSCULAR; INTRAVENOUS; SUBCUTANEOUS PRN
Status: DISCONTINUED | OUTPATIENT
Start: 2023-11-07 | End: 2023-11-07

## 2023-11-07 RX ORDER — DEXMEDETOMIDINE HYDROCHLORIDE 4 UG/ML
INJECTION, SOLUTION INTRAVENOUS PRN
Status: DISCONTINUED | OUTPATIENT
Start: 2023-11-07 | End: 2023-11-07

## 2023-11-07 RX ORDER — OXYCODONE HYDROCHLORIDE 5 MG/1
10 TABLET ORAL
Status: DISCONTINUED | OUTPATIENT
Start: 2023-11-07 | End: 2023-11-09 | Stop reason: HOSPADM

## 2023-11-07 RX ORDER — HYDROMORPHONE HYDROCHLORIDE 1 MG/ML
0.2 INJECTION, SOLUTION INTRAMUSCULAR; INTRAVENOUS; SUBCUTANEOUS EVERY 5 MIN PRN
Status: DISCONTINUED | OUTPATIENT
Start: 2023-11-07 | End: 2023-11-07 | Stop reason: HOSPADM

## 2023-11-07 RX ORDER — DEXAMETHASONE SODIUM PHOSPHATE 4 MG/ML
INJECTION, SOLUTION INTRA-ARTICULAR; INTRALESIONAL; INTRAMUSCULAR; INTRAVENOUS; SOFT TISSUE PRN
Status: DISCONTINUED | OUTPATIENT
Start: 2023-11-07 | End: 2023-11-07

## 2023-11-07 RX ORDER — ONDANSETRON 4 MG/1
4 TABLET, ORALLY DISINTEGRATING ORAL EVERY 30 MIN PRN
Status: DISCONTINUED | OUTPATIENT
Start: 2023-11-07 | End: 2023-11-09 | Stop reason: HOSPADM

## 2023-11-07 RX ORDER — SODIUM CHLORIDE, SODIUM LACTATE, POTASSIUM CHLORIDE, CALCIUM CHLORIDE 600; 310; 30; 20 MG/100ML; MG/100ML; MG/100ML; MG/100ML
INJECTION, SOLUTION INTRAVENOUS CONTINUOUS PRN
Status: DISCONTINUED | OUTPATIENT
Start: 2023-11-07 | End: 2023-11-07

## 2023-11-07 RX ORDER — CYCLOPENTOLAT/TROPIC/PHENYLEPH 1%-1%-2.5%
1 DROPS (EA) OPHTHALMIC (EYE)
Status: COMPLETED | OUTPATIENT
Start: 2023-11-07 | End: 2023-11-07

## 2023-11-07 RX ORDER — BALANCED SALT SOLUTION 6.4; .75; .48; .3; 3.9; 1.7 MG/ML; MG/ML; MG/ML; MG/ML; MG/ML; MG/ML
SOLUTION OPHTHALMIC PRN
Status: DISCONTINUED | OUTPATIENT
Start: 2023-11-07 | End: 2023-11-07 | Stop reason: HOSPADM

## 2023-11-07 RX ORDER — PROPOFOL 10 MG/ML
INJECTION, EMULSION INTRAVENOUS CONTINUOUS PRN
Status: DISCONTINUED | OUTPATIENT
Start: 2023-11-07 | End: 2023-11-07

## 2023-11-07 RX ORDER — ONDANSETRON 4 MG/1
4 TABLET, ORALLY DISINTEGRATING ORAL EVERY 30 MIN PRN
Status: DISCONTINUED | OUTPATIENT
Start: 2023-11-07 | End: 2023-11-07 | Stop reason: HOSPADM

## 2023-11-07 RX ORDER — FENTANYL CITRATE 50 UG/ML
25 INJECTION, SOLUTION INTRAMUSCULAR; INTRAVENOUS EVERY 5 MIN PRN
Status: DISCONTINUED | OUTPATIENT
Start: 2023-11-07 | End: 2023-11-07 | Stop reason: HOSPADM

## 2023-11-07 RX ORDER — ONDANSETRON 2 MG/ML
INJECTION INTRAMUSCULAR; INTRAVENOUS PRN
Status: DISCONTINUED | OUTPATIENT
Start: 2023-11-07 | End: 2023-11-07

## 2023-11-07 RX ORDER — ACETAMINOPHEN 325 MG/1
975 TABLET ORAL ONCE
Status: DISCONTINUED | OUTPATIENT
Start: 2023-11-07 | End: 2023-11-07 | Stop reason: HOSPADM

## 2023-11-07 RX ORDER — ONDANSETRON 2 MG/ML
4 INJECTION INTRAMUSCULAR; INTRAVENOUS EVERY 30 MIN PRN
Status: DISCONTINUED | OUTPATIENT
Start: 2023-11-07 | End: 2023-11-09 | Stop reason: HOSPADM

## 2023-11-07 RX ORDER — FENTANYL CITRATE 50 UG/ML
50 INJECTION, SOLUTION INTRAMUSCULAR; INTRAVENOUS EVERY 5 MIN PRN
Status: DISCONTINUED | OUTPATIENT
Start: 2023-11-07 | End: 2023-11-07 | Stop reason: HOSPADM

## 2023-11-07 RX ADMIN — Medication 1 DROP: at 11:59

## 2023-11-07 RX ADMIN — PROPOFOL 175 MCG/KG/MIN: 10 INJECTION, EMULSION INTRAVENOUS at 13:03

## 2023-11-07 RX ADMIN — LIDOCAINE HYDROCHLORIDE 100 MG: 20 INJECTION, SOLUTION INFILTRATION; PERINEURAL at 13:03

## 2023-11-07 RX ADMIN — SODIUM CHLORIDE, SODIUM LACTATE, POTASSIUM CHLORIDE, CALCIUM CHLORIDE: 600; 310; 30; 20 INJECTION, SOLUTION INTRAVENOUS at 12:59

## 2023-11-07 RX ADMIN — FENTANYL CITRATE 100 MCG: 50 INJECTION, SOLUTION INTRAMUSCULAR; INTRAVENOUS at 13:03

## 2023-11-07 RX ADMIN — Medication 1 DROP: at 12:06

## 2023-11-07 RX ADMIN — DEXAMETHASONE SODIUM PHOSPHATE 4 MG: 4 INJECTION, SOLUTION INTRA-ARTICULAR; INTRALESIONAL; INTRAMUSCULAR; INTRAVENOUS; SOFT TISSUE at 13:22

## 2023-11-07 RX ADMIN — EPHEDRINE SULFATE 5 MG: 50 INJECTION, SOLUTION INTRAMUSCULAR; INTRAVENOUS; SUBCUTANEOUS at 13:07

## 2023-11-07 RX ADMIN — PROPOFOL 150 MG: 10 INJECTION, EMULSION INTRAVENOUS at 13:03

## 2023-11-07 RX ADMIN — ONDANSETRON 4 MG: 2 INJECTION INTRAMUSCULAR; INTRAVENOUS at 13:22

## 2023-11-07 RX ADMIN — PROPARACAINE HYDROCHLORIDE 1 DROP: 5 SOLUTION/ DROPS OPHTHALMIC at 11:59

## 2023-11-07 RX ADMIN — Medication 1 DROP: at 12:19

## 2023-11-07 RX ADMIN — DEXMEDETOMIDINE HYDROCHLORIDE 8 MCG: 4 INJECTION, SOLUTION INTRAVENOUS at 14:08

## 2023-11-07 RX ADMIN — SODIUM CHLORIDE, SODIUM LACTATE, POTASSIUM CHLORIDE, CALCIUM CHLORIDE: 600; 310; 30; 20 INJECTION, SOLUTION INTRAVENOUS at 12:21

## 2023-11-07 NOTE — DISCHARGE INSTRUCTIONS
[facility]  Home Care Following Anesthesia  For 24 hours after surgery:  Get plenty of rest.  A responsible adult must stay with you for at least 24 hours after you leave the surgery center.  Do not drive or use heavy equipment.  If you have weakness or tingling, don't drive or use heavy equipment until this feeling goes away.   Do not drink alcohol.   Avoid strenuous or risky activities.  Ask for help when climbing stairs.  You may feel lightheaded.  IF so, sit for a few minutes before standing.  Have someone help you get up.   If you have nausea (feel sick to your stomach): Drink only clear liquids such as apple juice, ginger ale, broth or 7-Up.  Rest may also help.  Be sure to drink enough fluids.  Move to a regular diet as you feel able.   You may have a slight fever.  Call the doctor if your fever is over 100 F (37.7 C) (taken under the tongue) or lasts longer than 24 hours.  You may have a dry mouth, a sore throat, muscle aches or trouble sleeping. These should go away after 24 hours.  Do not make important or legal decisions.   It is recommended to avoid smoking.               Tips for taking pain medications  To get the best pain relief possible, remember these points:  Take pain medications as directed, before pain becomes severe.  Pain medication can upset your stomach: taking it with food may help.  Constipation is a common side effect of pain medication. Drink plenty of  fluids.  Eat foods high in fiber. Take a stool softener if recommended by your doctor or pharmacist.  Do not drink alcohol, drive or operate machinery while taking pain medications.  Ask about other ways to control pain, such as with heat, ice or relaxation.    Tylenol/Acetaminophen Consumption    If you feel your pain relief is insufficient, you may take Tylenol/Acetaminophen in addition to your narcotic pain medication.   Be careful not to exceed 4,000 mg of Tylenol/Acetaminophen in a 24 hour  period from all sources.  If you are taking extra strength Tylenol/acetaminophen (500 mg), the maximum dose is 8 tablets in 24 hours.  If you are taking regular strength acetaminophen (325 mg), the maximum dose is 12 tablets in 24 hours.    Call a doctor for any of the following:  Signs of infection (fever, growing tenderness at the surgery site, a large amount of drainage or bleeding, severe pain, foul-smelling drainage, redness, swelling).  It has been over 8 to 10 hours since surgery and you are still not able to urinate (pass water).  Headache for over 24 hours.  Numbness, tingling or weakness the day after surgery (if you had spinal anesthesia).  Signs of Covid-19 infection (temperature over 100 degrees, shortness of breath, cough, loss of taste/smell, generalized body aches, persistent headache, chills, sore throat, nausea/vomiting/diarrhea)  Your doctor is:       Dr. Slime Shepherd, Ophthalmology: [phone]               Or dial [phone] and ask for the resident on call for:  Ophthalmology  For emergency care, call the:  [facility]:  [phone] (TTY for hearing impaired: [phone])

## 2023-11-07 NOTE — ANESTHESIA POSTPROCEDURE EVALUATION
Patient: Donna Hurtado    Procedure: Procedure(s):  LEFT EYE VITRECTOMY, PARS PLANA APPROACH, USING 25-GAUGE INSTRUMENTS, ENDOLASER, AIR/FLUID EXCHANGE, INFUSION OF GAS (SF6 20%)       Anesthesia Type:  General    Note:  Disposition: Outpatient   Postop Pain Control: Uneventful            Sign Out: Well controlled pain   PONV: No   Neuro/Psych: Uneventful            Sign Out: Acceptable/Baseline neuro status   Airway/Respiratory: Uneventful            Sign Out: Acceptable/Baseline resp. status   CV/Hemodynamics: Uneventful            Sign Out: Acceptable CV status; No obvious hypovolemia; No obvious fluid overload   Other NRE: NONE   DID A NON-ROUTINE EVENT OCCUR? No           Last vitals:  Vitals:    11/07/23 1138   BP: 107/71   Pulse: 88   Resp: 16   Temp: 36.4  C (97.6  F)   SpO2: 99%       Electronically Signed By: Mickey Barkley MD  November 7, 2023  2:43 PM

## 2023-11-07 NOTE — ANESTHESIA CARE TRANSFER NOTE
Patient: Donna Hurtado    Procedure: Procedure(s):  LEFT EYE VITRECTOMY, PARS PLANA APPROACH, USING 25-GAUGE INSTRUMENTS, ENDOLASER, AIR/FLUID EXCHANGE, INFUSION OF GAS (SF6 20%)       Diagnosis: Left retinal detachment [H33.22]  Diagnosis Additional Information: No value filed.    Anesthesia Type:   General     Note:      Level of Consciousness: awake  Oxygen Supplementation: room air    Independent Airway: airway patency satisfactory and stable        Patient transferred to: PACU    Handoff Report: Identifed the Patient, Identified the Reponsible Provider, Reviewed the pertinent medical history, Discussed the surgical course, Reviewed Intra-OP anesthesia mangement and issues during anesthesia, Set expectations for post-procedure period and Allowed opportunity for questions and acknowledgement of understanding      Vitals:  Vitals Value Taken Time   BP     Temp     Pulse 74 11/07/23 1444   Resp 22 11/07/23 1444   SpO2 99 % 11/07/23 1444   Vitals shown include unfiled device data.    Electronically Signed By: BRITTANY Lopez CRNA  November 7, 2023  2:45 PM

## 2023-11-07 NOTE — ANESTHESIA PREPROCEDURE EVALUATION
Anesthesia Pre-Procedure Evaluation    Patient: Donna Hurtado   MRN: [id number] : 2000        Procedure : Procedure(s):  SCLERAL BUCKLING LEFT EYE  LEFT EYE VITRECTOMY, PARS PLANA APPROACH, USING 25-GAUGE INSTRUMENTS          Past Medical History:   Diagnosis Date    Amblyopia     left eye    Bicuspid aortic valve 2012    Dysfunction of eustachian tube       Past Surgical History:   Procedure Laterality Date    APPENDECTOMY      ENDOSCOPY      SCLERAL BUCKLE Left 2023    Procedure: SCLERAL BUCKLING /Cryotherapy /subretinal fluid drainage;  Surgeon: Slime Shepherd MD;  Location: [facility] OR    STRABISMUS SURGERY Left     TRACHEOESOPHAGEAL FISTULA CLOSURE      ZZC APPENDECTOMY        Allergies   Allergen Reactions    Cats     Dust Mites     Nkda [No Known Drug Allergy]     Pollen Extract     Ragweeds       Social History     Tobacco Use    Smoking status: Every Day     Types: Other    Smokeless tobacco: Never   Substance Use Topics    Alcohol use: No      Wt Readings from Last 1 Encounters:   23 49 kg (108 lb)        Anesthesia Evaluation            ROS/MED HX  ENT/Pulmonary:  - neg pulmonary ROS     Neurologic:  - neg neurologic ROS     Cardiovascular: Comment: Bicuspid aortic valve      METS/Exercise Tolerance:     Hematologic:  - neg hematologic  ROS     Musculoskeletal:  - neg musculoskeletal ROS     GI/Hepatic:  - neg GI/hepatic ROS     Renal/Genitourinary:  - neg Renal ROS     Endo:  - neg endo ROS     Psychiatric/Substance Use:     (+) psychiatric history        Infectious Disease:  - neg infectious disease ROS     Malignancy:       Other:            Physical Exam    Airway  airway exam normal      Mallampati: I       Respiratory Devices and Support         Dental     Comment: Invisalign    (+) Minor Abnormalities - some fillings, tiny chips and Removable bridges or other hardware      Cardiovascular   cardiovascular exam normal          Pulmonary   pulmonary exam normal       "          OUTSIDE LABS:  CBC:   Lab Results   Component Value Date    WBC 9.5 08/25/2019    HGB 12.7 08/25/2019    HCT 37.6 08/25/2019     08/25/2019     BMP:   Lab Results   Component Value Date     08/25/2019    POTASSIUM 3.4 (L) 08/25/2019    CHLORIDE 107 08/25/2019    CO2 23 08/25/2019    BUN 6 (L) 08/25/2019    CR 0.71 08/25/2019    GLC 75 08/25/2019     COAGS: No results found for: \"PTT\", \"INR\", \"FIBR\"  POC:   Lab Results   Component Value Date    HCG Negative 09/22/2023     HEPATIC:   Lab Results   Component Value Date    ALBUMIN 4.6 08/25/2019    PROTTOTAL 7.7 08/25/2019    ALT 14 08/25/2019    AST 18 08/25/2019    ALKPHOS 50 08/25/2019    BILITOTAL 0.4 08/25/2019     OTHER:   Lab Results   Component Value Date    SUSHMA 9.8 08/25/2019       Anesthesia Plan    ASA Status:  2    NPO Status:  NPO Appropriate    Anesthesia Type: General.     - Airway: LMA   Induction: Intravenous.   Maintenance: TIVA.        Consents    Anesthesia Plan(s) and associated risks, benefits, and realistic alternatives discussed. Questions answered and patient/representative(s) expressed understanding.     - Discussed: Risks, Benefits and Alternatives for the PROCEDURE were discussed     - Discussed with:  Patient            Postoperative Care    Pain management: IV analgesics, Oral pain medications, Multi-modal analgesia.   PONV prophylaxis: Ondansetron (or other 5HT-3), Background Propofol Infusion, Dexamethasone or Solumedrol     Comments:           H&P reviewed: Unable to attach H&P to encounter due to EHR limitations. H&P Update: appropriate H&P reviewed, patient examined. No interval changes since H&P (within 30 days).         Martin Brewer MD  "

## 2023-11-07 NOTE — OP NOTE
PRE-OP Dx:    1)Rhegmatogenous retinal detachment left eye    Post-OP Dx: same    Attending:   NASIM Shepherd MD  Fellow: BECCA Woods MD    Anesthesia: MAC + RB  Procedure:    1) Pars plana vitrectomy (PPV) 25g, endolaser left eye    2) FGx 20% SF6 left eye     EBL: scant  Specimens: none  Complications: none    Findings:    1) RD left eye  with break at 12, 1 and 2:30 o'clock.        Procedure Description:      Donna Hurtado is a 23 year old  year old patient with a history of Rhegmatogenous retinal detachment left eye. After informed consent was obtained, she was brought into the OR where RB anesthesia was administered. The eye was then prepped and draped in the usual fashion for ophthalmic surgery.    Attention was then turned to the vitrectomy. Marks were made on the sclera inferotemporally, superotemporally, and superonasally 4.0 mm posterior to the limbus.  The 25G transscleral cannulas were inserted through the sclera using the trocars. The infusion cannula was connected to the inferonasal cannula and directly visualized to verify it was in the correct location. A core vitrectomy was performed and the vitreous was stained with kenalog. The periphery was trimmed with depression. Retinal break(s) were found at 12, 1 and 2:30 o'clock. Traction was removed and all breaks were marked with diathermy.      Next an air-fluid exchange was performed and the retina flattened.  Laser was placed around all marked breaks. .     Next, an air-gas exchange was done with 20% SF6 and the cannulas were removed.  The sclerotomies were sutured with 6-0 plain suture as needed and were tight. The pressure was checked and verified to be appropriate. A drop of atropine, and maxtirol ointment was placed in the eye.  A pad and nails shield were taped over the eye.    The patient left the OR with no complications.     The surgery was assisted by Dr. Irwin Woods. Due to the delicate and complex nature of this surgery, an assistant was  required and No qualified resident was available. He assisted with scleral depression. I was present for the entire surgery.

## 2023-11-08 ENCOUNTER — OFFICE VISIT (OUTPATIENT)
Dept: OPHTHALMOLOGY | Facility: CLINIC | Age: 23
End: 2023-11-08
Attending: OPHTHALMOLOGY
Payer: COMMERCIAL

## 2023-11-08 DIAGNOSIS — Z48.810 AFTERCARE FOLLOWING SURGERY OF A SENSORY ORGAN: Primary | ICD-10-CM

## 2023-11-08 PROCEDURE — 99212 OFFICE O/P EST SF 10 MIN: CPT | Performed by: OPHTHALMOLOGY

## 2023-11-08 PROCEDURE — 99024 POSTOP FOLLOW-UP VISIT: CPT | Mod: GC | Performed by: OPHTHALMOLOGY

## 2023-11-08 ASSESSMENT — EXTERNAL EXAM - LEFT EYE: OS_EXAM: NORMAL

## 2023-11-08 ASSESSMENT — VISUAL ACUITY
OS_SC: HM
METHOD: SNELLEN - LINEAR
OD_SC: 20/60

## 2023-11-08 ASSESSMENT — TONOMETRY
IOP_METHOD: TONOPEN
OD_IOP_MMHG: 19
OS_IOP_MMHG: 13

## 2023-11-08 ASSESSMENT — SLIT LAMP EXAM - LIDS
COMMENTS: NORMAL
COMMENTS: NORMAL

## 2023-11-08 ASSESSMENT — EXTERNAL EXAM - RIGHT EYE: OD_EXAM: NORMAL

## 2023-11-08 ASSESSMENT — CUP TO DISC RATIO
OS_RATIO: 0.3
OD_RATIO: 0.3

## 2023-11-08 NOTE — NURSING NOTE
Chief Complaints and History of Present Illnesses   Patient presents with    Post Op (Ophthalmology) Left Eye     1 day post op      Chief Complaint(s) and History of Present Illness(es)       Post Op (Ophthalmology) Left Eye              Laterality: left eye    Comments: 1 day post op               Comments    SCLERAL BUCKLING LEFT EYE  LEFT EYE VITRECTOMY, PARS PLANA APPROACH, USING 25-GAUGE INSTRUMENTS  Had OK night hard to sleep, no pain  Bozena KNAPP 1:39 PM November 8, 2023

## 2023-11-08 NOTE — PROGRESS NOTES
CC -   Post Op OS  -  status post 25 g Pars plana vitrectomy / endolaser/ air fluid exchange/ gas SF6 20% left eye 11/07/23    -s/p scleral buckle, subretinal fluid drainage, and cryo of left eye 09/22/2023.    INTERVAL HISTORY: slept well; no eye pain  [facility] - Donna Hurtado is a  23 year old year-old patient with history of mac off RRD OS noted 9/21/23 with symptoms of blurriness and VFD noted 1 month prior  - Past ocular history: Ambylopia OS; myopia  - Contact lens wear: Occasional    PAST OCULAR SURGERY  strabismus surgery childhood  SBP #220/240/270 OS 9/22/23 ()  25 g Pars plana vitrectomy / endolaser/ air fluid exchange/ gas SF6 20% left eye 11/07/23  ()    RETINAL IMAGING:  OCT 11/02/23   OD -  macula - retina normal, PHF attached  OS -  macula - mild SRF better than pre-op   Periphery - mild srf      ASSESSMENT & PLAN    # 25 g Pars plana vitrectomy / endolaser/ air fluid exchange/ gas SF6 20% left eye 11/07/23     Slept well  Retina attached  Doing well    Plan:  Position: stright ; sleep R side down   No aviation  No heavy lifting   Urrutia shield at all times  Retina detachment and endophthalmitis precautions were discussed with the patient and was asked to return if any of the those occur    Medications to operative eye  Maxitrol (pink top) four times a day    Maxitrol oint at bedtime  Atropine (red top) once a day     Follow up in one week    ~~~~~~~~~~~~~~~~~~~~~~~~~~~~~~~~~~   Complete documentation of historical and exam elements from today's encounter can be found in the full encounter summary report (not reduplicated in this progress note).  I personally obtained the chief complaint(s) and history of present illness.  I confirmed and edited as necessary the review of systems, past medical/surgical history, family history, social history, and examination findings as documented by others; and I examined the patient myself.  I personally reviewed the relevant tests, images, and reports as documented  above.  I formulated and edited as necessary the assessment and plan and discussed the findings and management plan with the patient and family    Slime Shepherd MD  Professor of Ophthalmology  Vitreo-Retinal surgeon   Department of Ophthalmology and Visual Neurosciences   [facility]  Phone: [phone]   Fax: [phone]

## 2023-11-15 ENCOUNTER — OFFICE VISIT (OUTPATIENT)
Dept: OPHTHALMOLOGY | Facility: CLINIC | Age: 23
End: 2023-11-15
Attending: OPHTHALMOLOGY
Payer: COMMERCIAL

## 2023-11-15 DIAGNOSIS — H33.002 RHEGMATOGENOUS RETINAL DETACHMENT OF LEFT EYE: ICD-10-CM

## 2023-11-15 PROCEDURE — 99214 OFFICE O/P EST MOD 30 MIN: CPT | Performed by: OPHTHALMOLOGY

## 2023-11-15 PROCEDURE — 99024 POSTOP FOLLOW-UP VISIT: CPT | Mod: GC | Performed by: OPHTHALMOLOGY

## 2023-11-15 RX ORDER — ATROPINE SULFATE 10 MG/ML
1-2 SOLUTION/ DROPS OPHTHALMIC 4 TIMES DAILY
COMMUNITY

## 2023-11-15 RX ORDER — NEOMYCIN POLYMYXIN B SULFATES AND DEXAMETHASONE 3.5; 10000; 1 MG/ML; [USP'U]/ML; MG/ML
SUSPENSION/ DROPS OPHTHALMIC
Qty: 5 ML | Refills: 0 | Status: SHIPPED | OUTPATIENT
Start: 2023-11-15 | End: 2023-12-06

## 2023-11-15 ASSESSMENT — REFRACTION_WEARINGRX
OS_CYLINDER: +0.25
OD_CYLINDER: +0.75
OD_AXIS: 011
OS_SPHERE: -3.00
OD_SPHERE: -2.00
OS_AXIS: 115
SPECS_TYPE: SVL

## 2023-11-15 ASSESSMENT — TONOMETRY
OD_IOP_MMHG: 13
OS_IOP_MMHG: 15
IOP_METHOD: TONOPEN

## 2023-11-15 ASSESSMENT — CUP TO DISC RATIO
OD_RATIO: 0.3
OS_RATIO: 0.3

## 2023-11-15 ASSESSMENT — EXTERNAL EXAM - LEFT EYE: OS_EXAM: NORMAL

## 2023-11-15 ASSESSMENT — VISUAL ACUITY
OD_CC+: -2
OD_CC: 20/25
OS_CC: 20/125
OS_PH_CC+: -1
CORRECTION_TYPE: GLASSES
METHOD: SNELLEN - LINEAR
OS_PH_CC: 20/60

## 2023-11-15 ASSESSMENT — SLIT LAMP EXAM - LIDS
COMMENTS: NORMAL
COMMENTS: NORMAL

## 2023-11-15 ASSESSMENT — EXTERNAL EXAM - RIGHT EYE: OD_EXAM: NORMAL

## 2023-11-15 NOTE — PATIENT INSTRUCTIONS
Plan:  Position: straight up; and sleep R side down   No aviation  No heavy lifting   Urrutia shield at all times  Retina detachment and endophthalmitis precautions were discussed with the patient and was asked to return if any of the those occur    Medications to operative eye  Maxitrol (pink top) 3x/day for 1 week, then 2x/day for 1 week, then daily for 1 week, then stop   Maxitrol oint at bedtime   Atropine (red top) stop  Filled out work note/paperwork today

## 2023-11-15 NOTE — NURSING NOTE
Chief Complaints and History of Present Illnesses   Patient presents with    Post Op (Ophthalmology) Left Eye     Post pars plana vitrectomy (PPV) 25g, endolaser, FGx 20% SF6 left eye on 11/07/2023       Chief Complaint(s) and History of Present Illness(es)       Post Op (Ophthalmology) Left Eye              Laterality: left eye    Course: gradually improving    Associated symptoms: eye pain, photophobia and flashes.  Negative for floaters    Treatments tried: eye drops and ointment    Pain scale: 4/10    Comments: Post pars plana vitrectomy (PPV) 25g, endolaser, FGx 20% SF6 left eye on 11/07/2023                Comments    Patient returns for an 8 day post operative left eye exam.   She tells me that the bubble is getting smaller.  She uses Tylenol to lessen the pressure feeling in her left eye.  She also gets nauseated if she moves her head too fast., since the procedure.    Nitza Mcgraw, COT 2:27 PM  November 15, 2023

## 2023-11-15 NOTE — PROGRESS NOTES
CC -   Post Op OS  -  status post 25 g Pars plana vitrectomy / endolaser/ air fluid exchange/ gas SF6 20% left eye 11/07/23    -s/p scleral buckle, subretinal fluid drainage, and cryo of left eye 09/22/2023.    INTERVAL HISTORY: slept well; no eye pain  [facility] - Donna Hurtado is a  23 year old year-old patient with history of mac off RRD OS noted 9/21/23 with symptoms of blurriness and VFD noted 1 month prior  - Past ocular history: Ambylopia OS; myopia  - Contact lens wear: Occasional    PAST OCULAR SURGERY  strabismus surgery childhood  SBP #220/240/270 OS 9/22/23 ()  25 g Pars plana vitrectomy / endolaser/ air fluid exchange/ gas SF6 20% left eye 11/07/23  ()    RETINAL IMAGING:  OCT 11/02/23   OD -  macula - retina normal, PHF attached  OS -  macula - mild SRF better than pre-op   Periphery - mild srf      ASSESSMENT & PLAN    # 25 g Pars plana vitrectomy / endolaser/ air fluid exchange/ gas SF6 20% left eye 11/07/23   -POW#1  Retina attached  Doing well    Plan:  Position: straight up; and sleep R side down   No aviation  No heavy lifting   Urrutia shield at all times  Retina detachment and endophthalmitis precautions were discussed with the patient and was asked to return if any of the those occur    Medications to operative eye  Maxitrol (pink top) 3x/day for 1 week, then 2x/day for 1 week, then daily for 1 week, then stop (refill sent)  Maxitrol oint at bedtime (refill sent)  Atropine (red top) stop  Filled out work note/paperwork today    Follow up in 3 weeks with Optical Coherence Tomography and optos    Marco Sotelo MD  PGY-3 Ophthalmology    ~~~~~~~~~~~~~~~~~~~~~~~~~~~~~~~~~~   Complete documentation of historical and exam elements from today's encounter can be found in the full encounter summary report (not reduplicated in this progress note).  I personally obtained the chief complaint(s) and history of present illness.  I confirmed and edited as necessary the review of systems, past medical/surgical  history, family history, social history, and examination findings as documented by others; and I examined the patient myself.  I personally reviewed the relevant tests, images, and reports as documented above.  I formulated and edited as necessary the assessment and plan and discussed the findings and management plan with the patient and family    Slime Shepherd MD  Professor of Ophthalmology  Vitreo-Retinal surgeon   Department of Ophthalmology and Visual Neurosciences   [facility]  Phone: [phone]   Fax: [phone]

## 2023-11-28 DIAGNOSIS — H33.002 RHEGMATOGENOUS RETINAL DETACHMENT OF LEFT EYE: Primary | ICD-10-CM

## 2023-12-06 ENCOUNTER — OFFICE VISIT (OUTPATIENT)
Dept: OPHTHALMOLOGY | Facility: CLINIC | Age: 23
End: 2023-12-06
Attending: OPHTHALMOLOGY
Payer: COMMERCIAL

## 2023-12-06 DIAGNOSIS — H33.002 RHEGMATOGENOUS RETINAL DETACHMENT OF LEFT EYE: ICD-10-CM

## 2023-12-06 PROCEDURE — 99214 OFFICE O/P EST MOD 30 MIN: CPT | Performed by: OPHTHALMOLOGY

## 2023-12-06 PROCEDURE — 99024 POSTOP FOLLOW-UP VISIT: CPT | Performed by: OPHTHALMOLOGY

## 2023-12-06 PROCEDURE — 99207 FUNDUS PHOTOS OU (BOTH EYES): CPT | Mod: 26 | Performed by: OPHTHALMOLOGY

## 2023-12-06 PROCEDURE — 92134 CPTRZ OPH DX IMG PST SGM RTA: CPT | Performed by: OPHTHALMOLOGY

## 2023-12-06 PROCEDURE — 92250 FUNDUS PHOTOGRAPHY W/I&R: CPT | Performed by: OPHTHALMOLOGY

## 2023-12-06 ASSESSMENT — REFRACTION_WEARINGRX
OS_SPHERE: -3.00
SPECS_TYPE: SVL
OD_AXIS: 011
OS_AXIS: 115
OD_CYLINDER: +0.75
OS_CYLINDER: +0.25
OD_SPHERE: -2.00

## 2023-12-06 ASSESSMENT — TONOMETRY
OS_IOP_MMHG: 15
IOP_METHOD: TONOPEN
OD_IOP_MMHG: 14

## 2023-12-06 ASSESSMENT — VISUAL ACUITY
CORRECTION_TYPE: GLASSES
OS_CC: 20/80
OS_CC+: -1
OD_CC+: -2
OD_CC: 20/25
METHOD: SNELLEN - LINEAR
OS_PH_CC: 20/50

## 2023-12-06 ASSESSMENT — CUP TO DISC RATIO
OD_RATIO: 0.3
OS_RATIO: 0.3

## 2023-12-06 ASSESSMENT — CONF VISUAL FIELD
OD_INFERIOR_TEMPORAL_RESTRICTION: 0
METHOD: COUNTING FINGERS
OD_INFERIOR_NASAL_RESTRICTION: 0
OS_NORMAL: 1
OS_SUPERIOR_TEMPORAL_RESTRICTION: 0
OD_SUPERIOR_TEMPORAL_RESTRICTION: 0
OD_SUPERIOR_NASAL_RESTRICTION: 0
OS_SUPERIOR_NASAL_RESTRICTION: 0
OD_NORMAL: 1
OS_INFERIOR_TEMPORAL_RESTRICTION: 0
OS_INFERIOR_NASAL_RESTRICTION: 0

## 2023-12-06 ASSESSMENT — EXTERNAL EXAM - RIGHT EYE: OD_EXAM: NORMAL

## 2023-12-06 ASSESSMENT — SLIT LAMP EXAM - LIDS
COMMENTS: NORMAL
COMMENTS: NORMAL

## 2023-12-06 ASSESSMENT — EXTERNAL EXAM - LEFT EYE: OS_EXAM: NORMAL

## 2023-12-06 NOTE — PROGRESS NOTES
CC -   Post Op left eye    -  status post 25 g Pars plana vitrectomy / endolaser/ air fluid exchange/ gas SF6 20% left eye 11/07/23    -s/p scleral buckle, subretinal fluid drainage, and cryo of left eye 09/22/2023.    INTERVAL HISTORY: VA improving; no eye pain  [facility] - Donna Hurtado is a  23 year old year-old patient with history of mac off RRD OS noted 9/21/23 with symptoms of blurriness and VFD noted 1 month prior  - Past ocular history: Ambylopia OS; myopia  - Contact lens wear: Occasional    PAST OCULAR SURGERY  strabismus surgery childhood  SBP #220/240/270 OS 9/22/23 ()  25 g Pars plana vitrectomy / endolaser/ air fluid exchange/ gas SF6 20% left eye 11/07/23  ()    RETINAL IMAGING:  OCT 12/06/23    OD -  macula - retina normal, PHF attached  OS -  macula - mild SRF better than pre-op      ASSESSMENT & PLAN    # 25 g Pars plana vitrectomy / endolaser/ air fluid exchange/ gas SF6 20% left eye 11/07/23   -POM#1  Retina attached  Doing well    Plan:  Position: straight up; and sleep R side down   No aviation  No heavy lifting   Urrutia shield at all times  Retina detachment and endophthalmitis precautions were discussed with the patient and was asked to return if any of the those occur    Medications to operative eye  Maxitrol (pink top) daily until finish   Maxitrol oint at bedtime ok to stop  Filled out work note/paperwork today    Follow up in 4 weeks with Optical Coherence Tomography and optos and prescription     ~~~~~~~~~~~~~~~~~~~~~~~~~~~~~~~~~~   Complete documentation of historical and exam elements from today's encounter can be found in the full encounter summary report (not reduplicated in this progress note).  I personally obtained the chief complaint(s) and history of present illness.  I confirmed and edited as necessary the review of systems, past medical/surgical history, family history, social history, and examination findings as documented by others; and I examined the patient myself.  I  personally reviewed the relevant tests, images, and reports as documented above.  I formulated and edited as necessary the assessment and plan and discussed the findings and management plan with the patient and family    Slime Shepherd MD  Professor of Ophthalmology  Vitreo-Retinal surgeon   Department of Ophthalmology and Visual Neurosciences   [facility]  Phone: [phone]   Fax: [phone]

## 2023-12-06 NOTE — NURSING NOTE
"Chief Complaints and History of Present Illnesses   Patient presents with    Post Op (Ophthalmology) Left Eye     Post pars plana vitrectomy (PPV) 25g, endolaser, FGx 20% SF6 left eye on 11/07/2023     Chief Complaint(s) and History of Present Illness(es)       Post Op (Ophthalmology) Left Eye              Onset: 4 weeks ago    Associated symptoms: photophobia.  Negative for double vision, flashes and floaters    Treatments tried: eye drops    Pain scale: 0/10    Comments: Post pars plana vitrectomy (PPV) 25g, endolaser, FGx 20% SF6 left eye on 11/07/2023              Comments    Pt reports the gas bubble dissipated on thanksgiving day, after that vision has somewhat improved but still appears \"warped\". Denies curtains/flashes/double vision.  Still sensitive to light. Compliant with drops and taking as prescribed; lgtts 10 pm & 3 pm yesterday.     Shyam Kee OA, December 6, 2023                     "

## 2023-12-11 ENCOUNTER — TELEPHONE (OUTPATIENT)
Dept: OPHTHALMOLOGY | Facility: CLINIC | Age: 23
End: 2023-12-11
Payer: COMMERCIAL

## 2023-12-11 NOTE — TELEPHONE ENCOUNTER
[facility] Call Center    Phone Message    May a detailed message be left on voicemail: yes     Reason for Call: Other: Patient states the workability form her employer received is incorrect.  It states she could return to work on 11/18 with restrictions, but did not mention any eyesight restrictions.  However, patient states she and Dr. Shepherd had agreed at her 11/2 visit that she would stop work on 11/3 and would return to work on 12/7 after her 12/6 postop visit.  Please call.  Thank you.     Action Taken: Message routed to:  [facility] ([facility] Ophthalmology    Travel Screening: Not Applicable

## 2023-12-12 ENCOUNTER — MYC MEDICAL ADVICE (OUTPATIENT)
Dept: OPHTHALMOLOGY | Facility: CLINIC | Age: 23
End: 2023-12-12
Payer: COMMERCIAL

## 2023-12-14 ENCOUNTER — OFFICE VISIT (OUTPATIENT)
Dept: OPHTHALMOLOGY | Facility: CLINIC | Age: 23
End: 2023-12-14
Attending: OPHTHALMOLOGY
Payer: COMMERCIAL

## 2023-12-14 DIAGNOSIS — H33.002 RHEGMATOGENOUS RETINAL DETACHMENT OF LEFT EYE: Primary | ICD-10-CM

## 2023-12-14 PROCEDURE — 99024 POSTOP FOLLOW-UP VISIT: CPT | Performed by: OPHTHALMOLOGY

## 2023-12-14 PROCEDURE — 92134 CPTRZ OPH DX IMG PST SGM RTA: CPT | Performed by: OPHTHALMOLOGY

## 2023-12-14 PROCEDURE — 99212 OFFICE O/P EST SF 10 MIN: CPT | Performed by: OPHTHALMOLOGY

## 2023-12-14 PROCEDURE — 99207 FUNDUS PHOTOS OU (BOTH EYES): CPT | Mod: 26 | Performed by: OPHTHALMOLOGY

## 2023-12-14 PROCEDURE — 92250 FUNDUS PHOTOGRAPHY W/I&R: CPT | Performed by: OPHTHALMOLOGY

## 2023-12-14 ASSESSMENT — SLIT LAMP EXAM - LIDS
COMMENTS: NORMAL
COMMENTS: NORMAL

## 2023-12-14 ASSESSMENT — REFRACTION_WEARINGRX
OS_CYLINDER: +0.25
SPECS_TYPE: SVL
OD_SPHERE: -2.00
OD_CYLINDER: +0.75
OD_AXIS: 011
OS_AXIS: 115
OS_SPHERE: -3.00

## 2023-12-14 ASSESSMENT — VISUAL ACUITY
OD_CC+: -1
OS_CC: 20/100
METHOD: SNELLEN - LINEAR
CORRECTION_TYPE: GLASSES
OS_PH_CC: 20/50
OD_CC: 20/25
OS_PH_CC+: -1

## 2023-12-14 ASSESSMENT — EXTERNAL EXAM - RIGHT EYE: OD_EXAM: NORMAL

## 2023-12-14 ASSESSMENT — CUP TO DISC RATIO
OD_RATIO: 0.3
OS_RATIO: 0.3

## 2023-12-14 ASSESSMENT — TONOMETRY
IOP_METHOD: TONOPEN
OS_IOP_MMHG: 18
OD_IOP_MMHG: 16

## 2023-12-14 ASSESSMENT — EXTERNAL EXAM - LEFT EYE: OS_EXAM: NORMAL

## 2023-12-14 NOTE — NURSING NOTE
Chief Complaints and History of Present Illnesses   Patient presents with    Post Op (Ophthalmology) Left Eye     Post 25 g pars plana vitrectomy / endolaser/ air fluid exchange/ gas SF6 20% left eye 11/07/23     Chief Complaint(s) and History of Present Illness(es)       Post Op (Ophthalmology) Left Eye              Laterality: left eye    Course: gradually worsening    Associated symptoms: eye pain, headache, nausea, photophobia, flashes and swelling.  Negative for burning    Treatments tried: eye drops and ointment    Pain scale: 4/10    Comments: Post 25 g pars plana vitrectomy / endolaser/ air fluid exchange/ gas SF6 20% left eye 11/07/23              Comments    She states that last Friday she started noticing intermittent flashes in her left eye, that have been more frequent in the last two days.  She had some eye discomfort yesterday, which seemed improved with Maxitrol ointment use.  Her vision seems a bit worse in her left eye and she is increasingly light sensitive.      She has had nausea and vomiting the past couple days which seems to cause the headaches.      Nitza Mcgraw, BARBIE 12:27 PM  December 14, 2023

## 2023-12-14 NOTE — PROGRESS NOTES
CC -   Post Op left eye    -  status post 25 g Pars plana vitrectomy / endolaser/ air fluid exchange/ gas SF6 20% left eye 11/07/23    -s/p scleral buckle, subretinal fluid drainage, and cryo of left eye 09/22/2023.    INTERVAL HISTORY: new flashes of light left eye ; no eye pain; no curtain  [facility] - Donna Hurtado is a  23 year old year-old patient with history of mac off RRD OS noted 9/21/23 with symptoms of blurriness and VFD noted 1 month prior  - Past ocular history: Ambylopia OS; myopia  - Contact lens wear: Occasional    PAST OCULAR SURGERY  strabismus surgery childhood  SBP #220/240/270 OS 9/22/23 ()  25 g Pars plana vitrectomy / endolaser/ air fluid exchange/ gas SF6 20% left eye 11/07/23  ()    RETINAL IMAGING:  OCT 12/06/23    OD -  macula - retina normal, PHF attached  OS -  macula - mild SRF better than pre-op      ASSESSMENT & PLAN    # 25 g Pars plana vitrectomy / endolaser/ air fluid exchange/ gas SF6 20% left eye 11/07/23   For Retinal detachment    Retina attached  Doing well    Plan:  Position: any  Retina detachment and endophthalmitis precautions were discussed with the patient and was asked to return if any of the those occur  Follow up as scheduled with Optical Coherence Tomography   And prescription   ~~~~~~~~~~~~~~~~~~~~~~~~~~~~~~~~~~   Complete documentation of historical and exam elements from today's encounter can be found in the full encounter summary report (not reduplicated in this progress note).  I personally obtained the chief complaint(s) and history of present illness.  I confirmed and edited as necessary the review of systems, past medical/surgical history, family history, social history, and examination findings as documented by others; and I examined the patient myself.  I personally reviewed the relevant tests, images, and reports as documented above.  I formulated and edited as necessary the assessment and plan and discussed the findings and management plan with the  patient and family    Slime Shepherd MD  Professor of Ophthalmology  Vitreo-Retinal surgeon   Department of Ophthalmology and Visual Neurosciences   [facility]  Phone: [phone]   Fax: [phone]

## 2024-01-31 DIAGNOSIS — H33.002 RHEGMATOGENOUS RETINAL DETACHMENT OF LEFT EYE: Primary | ICD-10-CM

## 2024-02-02 ENCOUNTER — MYC MEDICAL ADVICE (OUTPATIENT)
Dept: OPHTHALMOLOGY | Facility: CLINIC | Age: 24
End: 2024-02-02
Payer: COMMERCIAL

## 2024-02-08 ENCOUNTER — OFFICE VISIT (OUTPATIENT)
Dept: OPHTHALMOLOGY | Facility: CLINIC | Age: 24
End: 2024-02-08
Attending: OPHTHALMOLOGY
Payer: COMMERCIAL

## 2024-02-08 DIAGNOSIS — H33.002 RHEGMATOGENOUS RETINAL DETACHMENT OF LEFT EYE: ICD-10-CM

## 2024-02-08 PROCEDURE — 92134 CPTRZ OPH DX IMG PST SGM RTA: CPT | Performed by: OPHTHALMOLOGY

## 2024-02-08 PROCEDURE — 99207 OCT RETINA SPECTRALIS OU (BOTH EYE): CPT | Mod: 26 | Performed by: OPHTHALMOLOGY

## 2024-02-08 PROCEDURE — 99213 OFFICE O/P EST LOW 20 MIN: CPT | Performed by: OPHTHALMOLOGY

## 2024-02-08 PROCEDURE — 99024 POSTOP FOLLOW-UP VISIT: CPT | Performed by: OPHTHALMOLOGY

## 2024-02-08 ASSESSMENT — CONF VISUAL FIELD
OD_INFERIOR_TEMPORAL_RESTRICTION: 0
OS_INFERIOR_NASAL_RESTRICTION: 0
OD_SUPERIOR_TEMPORAL_RESTRICTION: 0
OD_INFERIOR_NASAL_RESTRICTION: 0
OS_INFERIOR_TEMPORAL_RESTRICTION: 0
OD_SUPERIOR_NASAL_RESTRICTION: 0
OS_SUPERIOR_TEMPORAL_RESTRICTION: 0
OD_NORMAL: 1
OS_NORMAL: 1
OS_SUPERIOR_NASAL_RESTRICTION: 0
METHOD: COUNTING FINGERS

## 2024-02-08 ASSESSMENT — EXTERNAL EXAM - LEFT EYE: OS_EXAM: NORMAL

## 2024-02-08 ASSESSMENT — SLIT LAMP EXAM - LIDS
COMMENTS: NORMAL
COMMENTS: NORMAL

## 2024-02-08 ASSESSMENT — VISUAL ACUITY
METHOD: SNELLEN - LINEAR
OS_PH_CC: 20/50
OD_CC+: -2
CORRECTION_TYPE: GLASSES
OD_CC: 20/20
OS_CC: 20/70

## 2024-02-08 ASSESSMENT — TONOMETRY
IOP_METHOD: ICARE
OS_IOP_MMHG: 19
OD_IOP_MMHG: 21

## 2024-02-08 ASSESSMENT — EXTERNAL EXAM - RIGHT EYE: OD_EXAM: NORMAL

## 2024-02-08 ASSESSMENT — REFRACTION_WEARINGRX
SPECS_TYPE: SVL
OD_SPHERE: -2.00
OD_AXIS: 011
OS_CYLINDER: +0.25
OS_AXIS: 115
OS_SPHERE: -3.00
OD_CYLINDER: +0.75

## 2024-02-08 ASSESSMENT — CUP TO DISC RATIO
OS_RATIO: 0.3
OD_RATIO: 0.3

## 2024-02-08 NOTE — PROGRESS NOTES
CC -   Post Op left eye    -  status post 25 g Pars plana vitrectomy / endolaser/ air fluid exchange/ gas SF6 20% left eye 11/07/23    -s/p scleral buckle, subretinal fluid drainage, and cryo of left eye 09/22/2023.    INTERVAL HISTORY: No new flashes of light left eye ; occ eye pain- pressure sensation; no curtain  Complaining of persistent blurry vision. States took long time to decreased the dilation   [facility] - Donna Hurtado is a  23 year old year-old patient with history of mac off RRD OS noted 9/21/23 with symptoms of blurriness and VFD noted 1 month prior  - Past ocular history: Ambylopia OS; myopia  - Contact lens wear: Occasional    PAST OCULAR SURGERY  strabismus surgery childhood  SBP #220/240/270 OS 9/22/23 ()  25 g Pars plana vitrectomy / endolaser/ air fluid exchange/ gas SF6 20% left eye 11/07/23  ()    RETINAL IMAGING:  OCT 12/06/23    OD -  macula - retina normal, PHF attached  OS -  macula - mild SRF better than last Optical Coherence Tomography       ASSESSMENT & PLAN    # 25 g Pars plana vitrectomy / endolaser/ air fluid exchange/ gas SF6 20% left eye 11/07/23   For Retinal detachment    Retina attached  Doing well  Mild subretinal fluid improving     Plan:  Retina detachment and endophthalmitis precautions were discussed with the patient and was asked to return if any of the those occur  Follow up as scheduled with Optical Coherence Tomography     Follow up in one month with prescription and Optical Coherence Tomography  and optos autofluorescence   ~~~~~~~~~~~~~~~~~~~~~~~~~~~~~~~~~~   Complete documentation of historical and exam elements from today's encounter can be found in the full encounter summary report (not reduplicated in this progress note).  I personally obtained the chief complaint(s) and history of present illness.  I confirmed and edited as necessary the review of systems, past medical/surgical history, family history, social history, and examination findings as documented by  others; and I examined the patient myself.  I personally reviewed the relevant tests, images, and reports as documented above.  I formulated and edited as necessary the assessment and plan and discussed the findings and management plan with the patient and family    Slime Shepherd MD  Professor of Ophthalmology  Vitreo-Retinal surgeon   Department of Ophthalmology and Visual Neurosciences   [facility]  Phone: [phone]   Fax: [phone]

## 2024-02-08 NOTE — NURSING NOTE
"Chief Complaints and History of Present Illnesses   Patient presents with    Decreased Vision Evaluation     Pt here for decreased vision left eye     Chief Complaint(s) and History of Present Illness(es)       Decreased Vision Evaluation              Laterality: left eye    Comments: Pt here for decreased vision left eye              Comments    25 g Pars plana vitrectomy / endolaser/ air fluid exchange/ gas SF6 20% left eye 11/07/23   Pt is noting vision has remained warped in left eye x2 weeks. She states it has been getting worse and moving closer to central vision. Denies flashing lights or dark curtains Does have \"double vision\" that seems to be getting worse.                      "

## 2024-02-29 ENCOUNTER — MYC MEDICAL ADVICE (OUTPATIENT)
Dept: OPHTHALMOLOGY | Facility: CLINIC | Age: 24
End: 2024-02-29
Payer: COMMERCIAL

## 2024-03-01 NOTE — TELEPHONE ENCOUNTER
Spoke to pt at 0945    Pt with more distortion in past 3 weeks in left eye    Pt reporting monocular double vision in past week.    No acute vision loss/shadow in vision.    Moved next Thursday appt up to Monday with Dr. Shepherd    Pt aware of appt and aware may go to ED [facility] to any acute vision loss/shadow in vision over weekend.    Kendall Santiago RN 10:02 AM 03/01/24

## 2024-03-04 ENCOUNTER — OFFICE VISIT (OUTPATIENT)
Dept: OPHTHALMOLOGY | Facility: CLINIC | Age: 24
End: 2024-03-04
Attending: OPHTHALMOLOGY
Payer: COMMERCIAL

## 2024-03-04 DIAGNOSIS — H33.002 RHEGMATOGENOUS RETINAL DETACHMENT OF LEFT EYE: Primary | ICD-10-CM

## 2024-03-04 PROCEDURE — 92134 CPTRZ OPH DX IMG PST SGM RTA: CPT | Performed by: OPHTHALMOLOGY

## 2024-03-04 PROCEDURE — 92250 FUNDUS PHOTOGRAPHY W/I&R: CPT | Performed by: OPHTHALMOLOGY

## 2024-03-04 PROCEDURE — 99207 FUNDUS AUTOFLUORESCENCE IMAGE (FAF) OU (BOTH EYES): CPT | Mod: 26 | Performed by: OPHTHALMOLOGY

## 2024-03-04 PROCEDURE — 99214 OFFICE O/P EST MOD 30 MIN: CPT | Performed by: OPHTHALMOLOGY

## 2024-03-04 ASSESSMENT — EXTERNAL EXAM - RIGHT EYE: OD_EXAM: NORMAL

## 2024-03-04 ASSESSMENT — TONOMETRY
OD_IOP_MMHG: 18
IOP_METHOD: TONOPEN
OS_IOP_MMHG: 15

## 2024-03-04 ASSESSMENT — VISUAL ACUITY
OD_CC+: -1
OS_CC: 20/150
OS_PH_CC+: +1
CORRECTION_TYPE: GLASSES
METHOD: SNELLEN - LINEAR
OS_PH_CC: 20/50
OD_CC: 20/20

## 2024-03-04 ASSESSMENT — CUP TO DISC RATIO
OD_RATIO: 0.3
OS_RATIO: 0.3

## 2024-03-04 ASSESSMENT — CONF VISUAL FIELD
OD_SUPERIOR_TEMPORAL_RESTRICTION: 0
OD_INFERIOR_TEMPORAL_RESTRICTION: 0
OD_SUPERIOR_NASAL_RESTRICTION: 0
OS_NORMAL: 1
OD_NORMAL: 1
OS_SUPERIOR_TEMPORAL_RESTRICTION: 0
OD_INFERIOR_NASAL_RESTRICTION: 0
OS_INFERIOR_TEMPORAL_RESTRICTION: 0
OS_INFERIOR_NASAL_RESTRICTION: 0
OS_SUPERIOR_NASAL_RESTRICTION: 0

## 2024-03-04 ASSESSMENT — SLIT LAMP EXAM - LIDS
COMMENTS: NORMAL
COMMENTS: NORMAL

## 2024-03-04 ASSESSMENT — EXTERNAL EXAM - LEFT EYE: OS_EXAM: NORMAL

## 2024-03-04 NOTE — PROGRESS NOTES
CC -  Retinal detachment  Repair follow up   -  status post 25 g Pars plana vitrectomy / endolaser/ air fluid exchange/ gas SF6 20% left eye 11/07/23    -s/p scleral buckle, subretinal fluid drainage, and cryo of left eye 09/22/2023.    INTERVAL HISTORY: No new flashes of light left eye ; occ eye pain- pressure sensation; no curtain  Complaining of persistent blurry vision. States took long time to decreased the dilation   [facility] - Donna Hurtado is a  23 year old year-old patient with history of mac off RRD OS noted 9/21/23 with symptoms of blurriness and VFD noted 1 month prior  - Past ocular history: Ambylopia OS; myopia  - Contact lens wear: Occasional    PAST OCULAR SURGERY  strabismus surgery childhood  SBP #220/240/270 OS 9/22/23 ()  25 g Pars plana vitrectomy / endolaser/ air fluid exchange/ gas SF6 20% left eye 11/07/23  ()    RETINAL IMAGING:  OCT 12/06/23    OD -  macula - retina normal, PHF attached  OS -  macula - mild SRF better than last Optical Coherence Tomography       ASSESSMENT & PLAN    # 25 g Pars plana vitrectomy / endolaser/ air fluid exchange/ gas SF6 20% left eye 11/07/23   For Retinal detachment    Retina attached  Doing well  Mild subretinal fluid improving     # blepharitis both eyes   Warm compresses and artificial tears  as needed  Eyelid hygiene     Plan:  Retina detachment and endophthalmitis precautions were discussed with the patient and was asked to return if any of the those occur  Follow up in 6 weeks with prescription and Optical Coherence Tomography   ~~~~~~~~~~~~~~~~~~~~~~~~~~~~~~~~~~   Complete documentation of historical and exam elements from today's encounter can be found in the full encounter summary report (not reduplicated in this progress note).  I personally obtained the chief complaint(s) and history of present illness.  I confirmed and edited as necessary the review of systems, past medical/surgical history, family history, social history, and examination  findings as documented by others; and I examined the patient myself.  I personally reviewed the relevant tests, images, and reports as documented above.  I formulated and edited as necessary the assessment and plan and discussed the findings and management plan with the patient and family    Slime Shepherd MD  Professor of Ophthalmology  Vitreo-Retinal surgeon   Department of Ophthalmology and Visual Neurosciences   [facility]  Phone: [phone]   Fax: [phone]

## 2024-03-04 NOTE — NURSING NOTE
Chief Complaints and History of Present Illnesses   Patient presents with    Retinal Detachment Follow Up     Chief Complaint(s) and History of Present Illness(es)       Retinal Detachment Follow Up              Laterality: left eye    Duration: 3 weeks              Comments    Pt. States that she has noticed worsening double vision LE over the last week. Goes away when covering LE. Has been seeing more distortion with LE. Distance vision overall seems to be better. No flashes or new floaters BE. Has had some more pain and pressure LE. Has been feeling nauseated at times as well.  Mariella Moody COT 8:21 AM March 4, 2024                         Patient arrived in wheelchair to ED, awake, alert and oriented times 3, breathing unlabored.  Patient complaining of epigastric pain for 1 month.  patient was seen by GI and not followed up.  As per family, patient was taken off insulin since november due to needed surgery as per family.  Elevated blood sugar noted.  Open heart in November.  Dialysis Tues/TH/Sat.  patient did not take HTN meds today Patient arrived in wheelchair to ED, awake, alert and oriented times 3, breathing unlabored.  Patient complaining of epigastric pain for 1 month.  patient was seen by GI and not followed up.  As per family, patient was taken off insulin since november due to needed surgery as per family.  Elevated blood sugar as per family.  Open heart in November.  Dialysis Tues/TH/Sat.  patient did not take HTN meds today

## 2024-04-03 DIAGNOSIS — H33.002 RHEGMATOGENOUS RETINAL DETACHMENT OF LEFT EYE: Primary | ICD-10-CM

## 2024-04-18 ENCOUNTER — OFFICE VISIT (OUTPATIENT)
Dept: OPHTHALMOLOGY | Facility: CLINIC | Age: 24
End: 2024-04-18
Attending: OPHTHALMOLOGY
Payer: COMMERCIAL

## 2024-04-18 DIAGNOSIS — H33.002 RHEGMATOGENOUS RETINAL DETACHMENT OF LEFT EYE: ICD-10-CM

## 2024-04-18 PROCEDURE — 92134 CPTRZ OPH DX IMG PST SGM RTA: CPT | Performed by: OPHTHALMOLOGY

## 2024-04-18 PROCEDURE — 92015 DETERMINE REFRACTIVE STATE: CPT

## 2024-04-18 PROCEDURE — 99214 OFFICE O/P EST MOD 30 MIN: CPT | Performed by: OPHTHALMOLOGY

## 2024-04-18 PROCEDURE — 92012 INTRM OPH EXAM EST PATIENT: CPT | Performed by: OPHTHALMOLOGY

## 2024-04-18 RX ORDER — DORZOLAMIDE HCL 20 MG/ML
1 SOLUTION/ DROPS OPHTHALMIC 2 TIMES DAILY
Qty: 10 ML | Refills: 11 | Status: SHIPPED | OUTPATIENT
Start: 2024-04-18

## 2024-04-18 ASSESSMENT — SLIT LAMP EXAM - LIDS
COMMENTS: NORMAL
COMMENTS: NORMAL

## 2024-04-18 ASSESSMENT — REFRACTION_WEARINGRX
SPECS_TYPE: SVL
OS_SPHERE: -3.00
OD_SPHERE: -2.00
OS_CYLINDER: +0.25
OD_AXIS: 011
OD_CYLINDER: +0.75
OS_AXIS: 115

## 2024-04-18 ASSESSMENT — CUP TO DISC RATIO
OS_RATIO: 0.3
OD_RATIO: 0.3

## 2024-04-18 ASSESSMENT — TONOMETRY
OD_IOP_MMHG: 17
IOP_METHOD: TONOPEN
OS_IOP_MMHG: 15

## 2024-04-18 ASSESSMENT — EXTERNAL EXAM - RIGHT EYE: OD_EXAM: NORMAL

## 2024-04-18 ASSESSMENT — REFRACTION_MANIFEST
OD_AXIS: 013
OD_SPHERE: -2.75
OS_AXIS: 096
OS_CYLINDER: +0.75
OS_SPHERE: -5.50
OD_CYLINDER: +1.00

## 2024-04-18 ASSESSMENT — VISUAL ACUITY
OS_PH_CC+: -1
METHOD: SNELLEN - LINEAR
OS_PH_CC: 20/40
CORRECTION_TYPE: GLASSES
OD_CC: 20/20
OS_CC: 20/125

## 2024-04-18 ASSESSMENT — EXTERNAL EXAM - LEFT EYE: OS_EXAM: NORMAL

## 2024-04-18 NOTE — NURSING NOTE
Chief Complaints and History of Present Illnesses   Patient presents with    Retinal Detachment Follow Up     Chief Complaint(s) and History of Present Illness(es)       Retinal Detachment Follow Up              Laterality: left eye              Comments    Pt. States that she is seeing a lot of distortion LE. Occasional pain LE. No flashes or floaters BE.  Mariella Moody COT 2:05 PM April 18, 2024

## 2024-04-18 NOTE — PROGRESS NOTES
CC -  Retinal detachment  Repair follow up   -  status post 25 g Pars plana vitrectomy / endolaser/ air fluid exchange/ gas SF6 20% left eye 11/07/23    -s/p scleral buckle, subretinal fluid drainage, and cryo of left eye 09/22/2023.    INTERVAL HISTORY: No new flashes of light left eye ; occ eye pain- pressure sensation; no curtain  Sees computer better with old prescription than her current prescription    [facility] - Donna Hurtado is a  23 year old year-old patient with history of mac off RRD OS noted 9/21/23 with symptoms of blurriness and VFD noted 1 month prior  - Past ocular history: Ambylopia OS; myopia    PAST OCULAR SURGERY  strabismus surgery childhood  SBP #220/240/270 OS 9/22/23 ()  25 g Pars plana vitrectomy / endolaser/ air fluid exchange/ gas SF6 20% left eye 11/07/23  ()    RETINAL IMAGING:  OCT 04/18/24   OD -  macula - retina normal, PHF attached  OS -  macula - mild SRF better than last Optical Coherence Tomography       ASSESSMENT & PLAN    # 25 g Pars plana vitrectomy / endolaser/ air fluid exchange/ gas SF6 20% left eye 11/07/23   For Retinal detachment    Retina attached  Doing well  Mild subretinal fluid improving   Consider dorzolamide twice a day left eye     # blepharitis both eyes   Warm compresses and artificial tears  as needed  Eyelid hygiene     # trace Posterior subcapsular cataract (PSC) left eye  observe    Plan:  Follow up in 6 weeks with prescription (check both computer and distance) and Optical Coherence Tomography   Retina detachment precautions were discussed with the patient (presence or increased in flashes, floaters or a curtain in the visual field) and was asked to return if any of the those occur   ~~~~~~~~~~~~~~~~~~~~~~~~~~~~~~~~~~   Complete documentation of historical and exam elements from today's encounter can be found in the full encounter summary report (not reduplicated in this progress note).  I personally obtained the chief complaint(s) and history of present  illness.  I confirmed and edited as necessary the review of systems, past medical/surgical history, family history, social history, and examination findings as documented by others; and I examined the patient myself.  I personally reviewed the relevant tests, images, and reports as documented above.  I formulated and edited as necessary the assessment and plan and discussed the findings and management plan with the patient and family    Slime Shepherd MD  Professor of Ophthalmology  Vitreo-Retinal surgeon   Department of Ophthalmology and Visual Neurosciences   [facility]  Phone: [phone]   Fax: [phone]

## 2024-05-29 ENCOUNTER — OFFICE VISIT (OUTPATIENT)
Dept: OPHTHALMOLOGY | Facility: CLINIC | Age: 24
End: 2024-05-29
Attending: OPHTHALMOLOGY
Payer: COMMERCIAL

## 2024-05-29 DIAGNOSIS — H26.492 LEFT POSTERIOR CAPSULAR OPACIFICATION: ICD-10-CM

## 2024-05-29 DIAGNOSIS — H33.059 OLD RETINAL DETACHMENT, TOTAL OR SUBTOTAL, UNSPECIFIED LATERALITY: Primary | ICD-10-CM

## 2024-05-29 DIAGNOSIS — H52.7 REFRACTIVE ERROR: ICD-10-CM

## 2024-05-29 PROCEDURE — 92134 CPTRZ OPH DX IMG PST SGM RTA: CPT | Performed by: OPHTHALMOLOGY

## 2024-05-29 PROCEDURE — 99211 OFF/OP EST MAY X REQ PHY/QHP: CPT | Mod: 25 | Performed by: OPHTHALMOLOGY

## 2024-05-29 PROCEDURE — 99214 OFFICE O/P EST MOD 30 MIN: CPT | Mod: GC | Performed by: OPHTHALMOLOGY

## 2024-05-29 ASSESSMENT — REFRACTION_WEARINGRX
OD_SPHERE: -2.00
SPECS_TYPE: SVL
OS_CYLINDER: +0.25
OS_AXIS: 115
OD_AXIS: 011
OS_SPHERE: -3.00
OD_CYLINDER: +0.75

## 2024-05-29 ASSESSMENT — VISUAL ACUITY
METHOD: SNELLEN - LINEAR
OS_CC: 20/70
OD_CC: 20/20-3
OS_PH_CC: 20/40

## 2024-05-29 ASSESSMENT — SLIT LAMP EXAM - LIDS
COMMENTS: NORMAL
COMMENTS: NORMAL

## 2024-05-29 ASSESSMENT — EXTERNAL EXAM - LEFT EYE: OS_EXAM: NORMAL

## 2024-05-29 ASSESSMENT — REFRACTION_MANIFEST
OS_SPHERE: -5.50
OD_SPHERE: -2.75
OS_AXIS: 096
OS_CYLINDER: +0.75
OD_AXIS: 009
OS_ADD: +2.00
OD_CYLINDER: +0.75

## 2024-05-29 ASSESSMENT — CUP TO DISC RATIO
OS_RATIO: 0.3
OD_RATIO: 0.3

## 2024-05-29 ASSESSMENT — TONOMETRY
OS_IOP_MMHG: 15
OD_IOP_MMHG: 15
IOP_METHOD: TONOPEN

## 2024-05-29 ASSESSMENT — EXTERNAL EXAM - RIGHT EYE: OD_EXAM: NORMAL

## 2024-05-29 NOTE — PROGRESS NOTES
CC -  Retinal detachment  Repair follow up   -  status post 25 g Pars plana vitrectomy / endolaser/ air fluid exchange/ gas SF6 20% left eye 11/07/23    -s/p scleral buckle, subretinal fluid drainage, and cryo of left eye 09/22/2023.    INTERVAL HISTORY: one month follow up. Vision is still distorted in the left eye and has a bothersome spot in her vision that is cloudy but no recent changes. Using dorzolamide twice a day left eye.    [facility] - Donna Hurtado is a  23 year old year-old patient with history of mac off RRD OS noted 9/21/23 with symptoms of blurriness and VFD noted 1 month prior  - Past ocular history: Ambylopia OS; myopia    PAST OCULAR SURGERY  strabismus surgery childhood  SBP #220/240/270 OS 9/22/23 ()  25 g Pars plana vitrectomy / endolaser/ air fluid exchange/ gas SF6 20% left eye 11/07/23  ()    RETINAL IMAGING:  OCT 5/29/24  OD -  macula - retina normal, PHF attached  OS -  macula - mild SRF stable compared to last visit in April      ASSESSMENT & PLAN    # 25 g Pars plana vitrectomy / endolaser/ air fluid exchange/ gas SF6 20% left eye 11/07/23   For Retinal detachment    Retina attached  Doing well  Mild subretinal fluid stable today  Currently on dorzolamide twice daily left eye    # blepharitis both eyes   Warm compresses and artificial tears  as needed  Eyelid hygiene     # trace Posterior subcapsular cataract (PSC) left eye  Observe    #Refractive error, bilateral  Updated Mrx given today    Plan: prescription given 05/29/24   Follow up in 12 weeks with Optical Coherence Tomography   Retina detachment precautions were discussed with the patient (presence or increased in flashes, floaters or a curtain in the visual field) and was asked to return if any of the those occur     Allison Herman MD  PGY3 Ophthalmology  ~~~~~~~~~~~~~~~~~~~~~~~~~~~~~~~~~~   Complete documentation of historical and exam elements from today's encounter can be found in the full encounter summary report (not  reduplicated in this progress note).  I personally obtained the chief complaint(s) and history of present illness.  I confirmed and edited as necessary the review of systems, past medical/surgical history, family history, social history, and examination findings as documented by others; and I examined the patient myself.  I personally reviewed the relevant tests, images, and reports as documented above.  I personally reviewed the ophthalmic test(s) associated with this encounter, agree with the interpretation(s) as documented by the resident/fellow, and have edited the corresponding report(s) as necessary.   I formulated and edited as necessary the assessment and plan and discussed the findings and management plan with the patient and family    lSime Shepherd MD  Professor of Ophthalmology  Vitreo-Retinal surgeon   Department of Ophthalmology and Visual Neurosciences   [facility]  Phone: [phone]   Fax: [phone]

## 2024-08-06 DIAGNOSIS — H33.059 OLD RETINAL DETACHMENT, TOTAL OR SUBTOTAL, UNSPECIFIED LATERALITY: Primary | ICD-10-CM

## 2024-08-21 ENCOUNTER — OFFICE VISIT (OUTPATIENT)
Dept: OPHTHALMOLOGY | Facility: CLINIC | Age: 24
End: 2024-08-21
Attending: OPHTHALMOLOGY
Payer: COMMERCIAL

## 2024-08-21 DIAGNOSIS — H33.059 OLD RETINAL DETACHMENT, TOTAL OR SUBTOTAL, UNSPECIFIED LATERALITY: ICD-10-CM

## 2024-08-21 PROCEDURE — 99214 OFFICE O/P EST MOD 30 MIN: CPT | Performed by: OPHTHALMOLOGY

## 2024-08-21 PROCEDURE — 92134 CPTRZ OPH DX IMG PST SGM RTA: CPT | Performed by: OPHTHALMOLOGY

## 2024-08-21 PROCEDURE — 99211 OFF/OP EST MAY X REQ PHY/QHP: CPT | Performed by: OPHTHALMOLOGY

## 2024-08-21 ASSESSMENT — VISUAL ACUITY
OD_CC: 20/20
OS_PH_CC: 20/30
CORRECTION_TYPE: GLASSES
OD_CC+: -3
OS_CC: 20/80
METHOD: SNELLEN - LINEAR
OS_CC+: -1+1
OS_PH_CC+: -2+2

## 2024-08-21 ASSESSMENT — CUP TO DISC RATIO
OD_RATIO: 0.3
OS_RATIO: 0.3

## 2024-08-21 ASSESSMENT — TONOMETRY
OS_IOP_MMHG: 14
OD_IOP_MMHG: 16
IOP_METHOD: TONOPEN

## 2024-08-21 ASSESSMENT — REFRACTION_WEARINGRX
OD_AXIS: 011
OD_SPHERE: -2.00
SPECS_TYPE: SVL
OS_CYLINDER: +0.25
OS_SPHERE: -3.00
OD_CYLINDER: +0.75
OS_AXIS: 115

## 2024-08-21 ASSESSMENT — SLIT LAMP EXAM - LIDS
COMMENTS: NORMAL
COMMENTS: NORMAL

## 2024-08-21 ASSESSMENT — EXTERNAL EXAM - LEFT EYE: OS_EXAM: NORMAL

## 2024-08-21 ASSESSMENT — EXTERNAL EXAM - RIGHT EYE: OD_EXAM: NORMAL

## 2024-08-21 NOTE — PROGRESS NOTES
CC -  Retinal detachment  Repair follow up   -  status post 25 g Pars plana vitrectomy / endolaser/ air fluid exchange/ gas SF6 20% left eye 11/07/23    -s/p scleral buckle, subretinal fluid drainage, and cryo of left eye 09/22/2023.    INTERVAL HISTORY: Vision is still blurry in the left eye; no flashes and floaters. Using dorzolamide twice a day left eye.    NÉSTOR Thompson is a  24 year old year-old patient with history of mac off RRD OS noted 9/21/23 with symptoms of blurriness and VFD noted 1 month prior  - Past ocular history: Ambylopia OS; myopia    PAST OCULAR SURGERY  strabismus surgery childhood  SBP #220/240/270 OS 9/22/23 ()  25 g Pars plana vitrectomy / endolaser/ air fluid exchange/ gas SF6 20% left eye 11/07/23  ()    RETINAL IMAGING:  OCT 5/29/24  OD -  macula - retina normal, PHF attached  OS -  macula - trace SRF resolving and improving compared to last visit       ASSESSMENT & PLAN    # 25 g Pars plana vitrectomy / endolaser/ air fluid exchange/ gas SF6 20% left eye 11/07/23   For Retinal detachment    Retina attached  Doing well  Currently on dorzolamide twice daily left eye    # blepharitis both eyes   Warm compresses and artificial tears  as needed  Eyelid hygiene     # trace Posterior subcapsular cataract (PSC) left eye  Observe    #Refractive error, bilateral  Updated Mrx given ANDRES    Plan:   prescription given 05/29/24   Follow up in 12 weeks with Optical Coherence Tomography   Retina detachment precautions were discussed with the patient (presence or increased in flashes, floaters or a curtain in the visual field) and was asked to return if any of the those occur     ~~~~~~~~~~~~~~~~~~~~~~~~~~~~~~~~~~   Complete documentation of historical and exam elements from today's encounter can be found in the full encounter summary report (not reduplicated in this progress note).  I personally obtained the chief complaint(s) and history of present illness.  I confirmed and edited as necessary the  review of systems, past medical/surgical history, family history, social history, and examination findings as documented by others; and I examined the patient myself.  I personally reviewed the relevant tests, images, and reports as documented above.  I formulated and edited as necessary the assessment and plan and discussed the findings and management plan with the patient and family    Slime Shepherd MD  Professor of Ophthalmology  Vitreo-Retinal surgeon   Department of Ophthalmology and Visual Neurosciences   [facility]  Phone: [phone]   Fax: [phone]

## 2024-08-21 NOTE — NURSING NOTE
Chief Complaints and History of Present Illnesses   Patient presents with    Follow Up     12 week follow up     Chief Complaint(s) and History of Present Illness(es)       Follow Up              Associated symptoms: double vision and flashes.  Negative for eye pain and floaters    Treatments tried: eye drops    Pain scale: 0/10    Comments: 12 week follow up              Comments    Pt states she hasn't had a chance to fill glasses Rx.   She noticed some flashes left eye 1-2 times per week.   Denies new floaters, or curtains.   Pt still seeing double vision left eye   Feels a pulling sensation left eye every other day.     Ocular Meds:  Dorzolamide BID left eye   Refresh AT PRN    Juan A  3:18 PM August 21, 2024

## 2024-12-29 ENCOUNTER — HEALTH MAINTENANCE LETTER (OUTPATIENT)
Age: 24
End: 2024-12-29

## 2025-05-14 ENCOUNTER — OFFICE VISIT (OUTPATIENT)
Dept: OPHTHALMOLOGY | Facility: CLINIC | Age: 25
End: 2025-05-14
Attending: OPHTHALMOLOGY
Payer: COMMERCIAL

## 2025-05-14 DIAGNOSIS — H33.002 RHEGMATOGENOUS RETINAL DETACHMENT OF LEFT EYE: ICD-10-CM

## 2025-05-14 DIAGNOSIS — H33.002 RHEGMATOGENOUS RETINAL DETACHMENT OF LEFT EYE: Primary | ICD-10-CM

## 2025-05-14 PROCEDURE — 92134 CPTRZ OPH DX IMG PST SGM RTA: CPT | Performed by: OPHTHALMOLOGY

## 2025-05-14 PROCEDURE — 99214 OFFICE O/P EST MOD 30 MIN: CPT | Performed by: OPHTHALMOLOGY

## 2025-05-14 ASSESSMENT — REFRACTION_WEARINGRX
SPECS_TYPE: COMPUTER
OS_CYLINDER: +0.75
SPECS_TYPE: DISTANCE
OS_SPHERE: -5.50
OS_AXIS: 096
OD_CYLINDER: +0.75
OD_SPHERE: -2.75
OD_AXIS: 009
OD_AXIS: 009
OD_SPHERE: -2.75
OS_SPHERE: -3.50
OD_CYLINDER: +0.75
OS_AXIS: 096
OS_CYLINDER: +0.75

## 2025-05-14 ASSESSMENT — VISUAL ACUITY
METHOD: SNELLEN - LINEAR
OS_CC: 20/30
OS_CC+: -1
CORRECTION_TYPE: GLASSES
OD_CC: 20/30
OD_PH_CC: 20/25

## 2025-05-14 ASSESSMENT — SLIT LAMP EXAM - LIDS
COMMENTS: NORMAL
COMMENTS: NORMAL

## 2025-05-14 ASSESSMENT — TONOMETRY
IOP_METHOD: TONOPEN
OS_IOP_MMHG: 13
OD_IOP_MMHG: 14

## 2025-05-14 ASSESSMENT — CUP TO DISC RATIO
OS_RATIO: 0.3
OD_RATIO: 0.3

## 2025-05-14 ASSESSMENT — EXTERNAL EXAM - LEFT EYE: OS_EXAM: NORMAL

## 2025-05-14 ASSESSMENT — EXTERNAL EXAM - RIGHT EYE: OD_EXAM: NORMAL

## 2025-05-14 NOTE — NURSING NOTE
Chief Complaints and History of Present Illnesses   Patient presents with    Follow Up     Retinal detachment  Repair follow up   -  status post 25 g Pars plana vitrectomy / endolaser/ air fluid exchange/ gas SF6 20% left eye 11/07/23        Chief Complaint(s) and History of Present Illness(es)       Follow Up              Comments: Retinal detachment  Repair follow up   -  status post 25 g Pars plana vitrectomy / endolaser/ air fluid exchange/ gas SF6 20% left eye 11/07/23                 Comments    Pt got new glasses since last visit   No flashes or floaters   No eye pain or tearing     Carlene Niño COT 2:51 PM May 14, 2025

## 2025-05-14 NOTE — PROGRESS NOTES
CC -  Retinal detachment  Repair follow up   -  status post 25 g Pars plana vitrectomy / endolaser/ air fluid exchange/ gas SF6 20% left eye 11/07/23    -s/p scleral buckle, subretinal fluid drainage, and cryo of left eye 09/22/2023.    INTERVAL HISTORY: No changes since last visit. Got new glasses which are working well. No new flashes or floaters. Notes that left eye vision appears to be tilted right.     NÉSTOR Thompson is a  24 year old year-old patient with history of mac off RRD OS noted 9/21/23 with symptoms of blurriness and VFD noted 1 month prior  - Past ocular history: Ambylopia OS; myopia    PAST OCULAR SURGERY  strabismus surgery childhood  SBP #220/240/270 OS 9/22/23 ()  25 g Pars plana vitrectomy / endolaser/ air fluid exchange/ gas SF6 20% left eye 11/07/23  ()    RETINAL IMAGING:  OCT 5/14/25  OD -  macula - retina normal, PHF attached  OS -  macula - resolved subretinal fluid , normal foveal contour; trace Epiretinal membrane; small hyperreflectivity epi fovea- unclear cause    ASSESSMENT & PLAN    # 25 g Pars plana vitrectomy / endolaser/ air fluid exchange/ gas SF6 20% left eye 11/07/23   For Retinal detachment    Retina attached  Doing well  Continue on dorzolamide twice daily left eye    # blepharitis both eyes   Warm compresses and artificial tears  as needed  Eyelid hygiene     # trace Posterior subcapsular cataract (PSC) left eye  Observe    #Refractive error, bilateral  Updated Mrx given last eye examination  Plan for new refraction at next visit    Plan:   Follow up in 12 weeks with Optical Coherence Tomography, retinal nerve fiber layer; manifest refraction ; no dilation  Stop dorzolamide one month prior to next follow up   Retina detachment precautions were discussed with the patient (presence or increased in flashes, floaters or a curtain in the visual field) and was asked to return if any of the those occur     Vijay Pittman MD  PGY-3 Ophthalmology Resident  [facility]  [place]    ~~~~~~~~~~~~~~~~~~~~~~~~~~~~~~~~~~   Complete documentation of historical and exam elements from today's encounter can be found in the full encounter summary report (not reduplicated in this progress note).  I personally obtained the chief complaint(s) and history of present illness.  I confirmed and edited as necessary the review of systems, past medical/surgical history, family history, social history, and examination findings as documented by others; and I examined the patient myself.  I personally reviewed the relevant tests, images, and reports as documented above.  I formulated and edited as necessary the assessment and plan and discussed the findings and management plan with the patient and family    Slime Shepherd MD  Professor of Ophthalmology  Vitreo-Retinal surgeon   Department of Ophthalmology and Visual Neurosciences   [facility]  Phone: [phone]   Fax: [phone]

## (undated) DEVICE — BLADE KNIFE BEAVER MINI STR BEAVER6900

## (undated) DEVICE — SU MERSILENE 5-0 S-24DA 18" 1761G

## (undated) DEVICE — TAPE MICROPORE 1"X1.5YD 1530S-1

## (undated) DEVICE — TAPE MICROPORE 2"X1.5YD 1530S-2

## (undated) DEVICE — SYR 01ML 27GA 0.5" ECLIPSE 305789

## (undated) DEVICE — EYE CANN SOFT TIP 25GA FOR VALVED SET 8065149530

## (undated) DEVICE — LIGHT HANDLE X1 31140133

## (undated) DEVICE — SYR 01ML 27GA 0.5" NDL TBC 309623

## (undated) DEVICE — GLOVE BIOGEL PI MICRO SZ 6.0 48560

## (undated) DEVICE — LINEN TOWEL PACK X5 5464

## (undated) DEVICE — ESU CORD BIPOLAR GREEN 10-4000

## (undated) DEVICE — EYE SHIELD PLASTIC

## (undated) DEVICE — PACK VITRECTOMY/RET CUSTOM ASC PQ15VRU12

## (undated) DEVICE — SU PLAIN 6-0 TG140-8 18" 1735G

## (undated) DEVICE — SU SILK 2-0 TIE 12X30" A305H

## (undated) DEVICE — EYE NDL RETROBULBAR ATKINSON 25GA 1.5" 581637

## (undated) DEVICE — EYE DRAPE MICROSCOPE UNIVERSAL (BIOM FULL) 08-MK55140

## (undated) DEVICE — COVER CAMERA IN-LIGHT DISP LT-C02

## (undated) DEVICE — SOL WATER IRRIG 500ML BOTTLE 2F7113

## (undated) DEVICE — EYE PACK 25GA CONSTELLATION 10,000 CPM PPK9380-02

## (undated) RX ORDER — PROPOFOL 10 MG/ML
INJECTION, EMULSION INTRAVENOUS
Status: DISPENSED
Start: 2023-09-22

## (undated) RX ORDER — ONDANSETRON 2 MG/ML
INJECTION INTRAMUSCULAR; INTRAVENOUS
Status: DISPENSED
Start: 2023-11-07

## (undated) RX ORDER — FENTANYL CITRATE 50 UG/ML
INJECTION, SOLUTION INTRAMUSCULAR; INTRAVENOUS
Status: DISPENSED
Start: 2023-09-22

## (undated) RX ORDER — DEXAMETHASONE SODIUM PHOSPHATE 4 MG/ML
INJECTION, SOLUTION INTRA-ARTICULAR; INTRALESIONAL; INTRAMUSCULAR; INTRAVENOUS; SOFT TISSUE
Status: DISPENSED
Start: 2023-09-22

## (undated) RX ORDER — FENTANYL CITRATE 50 UG/ML
INJECTION, SOLUTION INTRAMUSCULAR; INTRAVENOUS
Status: DISPENSED
Start: 2023-11-07

## (undated) RX ORDER — DEXMEDETOMIDINE HYDROCHLORIDE 4 UG/ML
INJECTION, SOLUTION INTRAVENOUS
Status: DISPENSED
Start: 2023-09-22

## (undated) RX ORDER — DEXAMETHASONE SODIUM PHOSPHATE 4 MG/ML
INJECTION, SOLUTION INTRA-ARTICULAR; INTRALESIONAL; INTRAMUSCULAR; INTRAVENOUS; SOFT TISSUE
Status: DISPENSED
Start: 2023-11-07

## (undated) RX ORDER — ONDANSETRON 2 MG/ML
INJECTION INTRAMUSCULAR; INTRAVENOUS
Status: DISPENSED
Start: 2023-09-22

## (undated) RX ORDER — GLYCOPYRROLATE 0.2 MG/ML
INJECTION INTRAMUSCULAR; INTRAVENOUS
Status: DISPENSED
Start: 2023-09-22

## (undated) RX ORDER — PROPOFOL 10 MG/ML
INJECTION, EMULSION INTRAVENOUS
Status: DISPENSED
Start: 2023-11-07

## (undated) RX ORDER — EPHEDRINE SULFATE 50 MG/ML
INJECTION, SOLUTION INTRAMUSCULAR; INTRAVENOUS; SUBCUTANEOUS
Status: DISPENSED
Start: 2023-11-07